# Patient Record
Sex: FEMALE | Race: WHITE | NOT HISPANIC OR LATINO
[De-identification: names, ages, dates, MRNs, and addresses within clinical notes are randomized per-mention and may not be internally consistent; named-entity substitution may affect disease eponyms.]

---

## 2017-06-30 ENCOUNTER — APPOINTMENT (OUTPATIENT)
Dept: FAMILY MEDICINE | Facility: CLINIC | Age: 22
End: 2017-06-30

## 2017-06-30 VITALS
DIASTOLIC BLOOD PRESSURE: 60 MMHG | OXYGEN SATURATION: 100 % | HEART RATE: 75 BPM | RESPIRATION RATE: 16 BRPM | SYSTOLIC BLOOD PRESSURE: 100 MMHG | HEIGHT: 63.5 IN | TEMPERATURE: 98.8 F | BODY MASS INDEX: 19.25 KG/M2 | WEIGHT: 110 LBS

## 2017-07-03 LAB — BACTERIA THROAT CULT: NORMAL

## 2017-07-14 ENCOUNTER — LABORATORY RESULT (OUTPATIENT)
Age: 22
End: 2017-07-14

## 2017-07-14 ENCOUNTER — APPOINTMENT (OUTPATIENT)
Dept: FAMILY MEDICINE | Facility: CLINIC | Age: 22
End: 2017-07-14

## 2017-07-14 VITALS
SYSTOLIC BLOOD PRESSURE: 110 MMHG | WEIGHT: 110 LBS | TEMPERATURE: 100 F | OXYGEN SATURATION: 97 % | HEART RATE: 116 BPM | HEIGHT: 63.5 IN | BODY MASS INDEX: 19.25 KG/M2 | RESPIRATION RATE: 15 BRPM | DIASTOLIC BLOOD PRESSURE: 70 MMHG

## 2017-07-17 ENCOUNTER — LABORATORY RESULT (OUTPATIENT)
Age: 22
End: 2017-07-17

## 2017-07-17 ENCOUNTER — APPOINTMENT (OUTPATIENT)
Dept: FAMILY MEDICINE | Facility: CLINIC | Age: 22
End: 2017-07-17

## 2017-07-17 VITALS
HEART RATE: 98 BPM | WEIGHT: 110 LBS | DIASTOLIC BLOOD PRESSURE: 60 MMHG | OXYGEN SATURATION: 100 % | SYSTOLIC BLOOD PRESSURE: 100 MMHG | RESPIRATION RATE: 14 BRPM | BODY MASS INDEX: 19.25 KG/M2 | TEMPERATURE: 98 F | HEIGHT: 63.5 IN

## 2017-07-17 LAB
ALBUMIN SERPL ELPH-MCNC: 4.1 G/DL
ALP BLD-CCNC: 225 U/L
ALT SERPL-CCNC: 332 U/L
ANION GAP SERPL CALC-SCNC: 17 MMOL/L
AST SERPL-CCNC: 270 U/L
BASOPHILS # BLD AUTO: 0 K/UL
BASOPHILS NFR BLD AUTO: 0 %
BILIRUB SERPL-MCNC: 1.4 MG/DL
BUN SERPL-MCNC: 11 MG/DL
CALCIUM SERPL-MCNC: 8.9 MG/DL
CHLORIDE SERPL-SCNC: 96 MMOL/L
CO2 SERPL-SCNC: 22 MMOL/L
CREAT SERPL-MCNC: 0.83 MG/DL
EOSINOPHIL # BLD AUTO: 0 K/UL
EOSINOPHIL NFR BLD AUTO: 0 %
GLUCOSE SERPL-MCNC: 171 MG/DL
HCT VFR BLD CALC: 34.1 %
HETEROPH AB SER QL: POSITIVE
HGB BLD-MCNC: 11.4 G/DL
LYMPHOCYTES # BLD AUTO: 3.5 K/UL
LYMPHOCYTES NFR BLD AUTO: 62 %
MAN DIFF?: NORMAL
MCHC RBC-ENTMCNC: 19.5 PG
MCHC RBC-ENTMCNC: 33.4 GM/DL
MCV RBC AUTO: 58.3 FL
MONOCYTES # BLD AUTO: 0.85 K/UL
MONOCYTES NFR BLD AUTO: 15 %
NEUTROPHILS # BLD AUTO: 0.73 K/UL
NEUTROPHILS NFR BLD AUTO: 12 %
PLATELET # BLD AUTO: 180 K/UL
POTASSIUM SERPL-SCNC: 3.3 MMOL/L
PROT SERPL-MCNC: 7.7 G/DL
RBC # BLD: 5.85 M/UL
RBC # FLD: 14 %
SODIUM SERPL-SCNC: 135 MMOL/L
WBC # FLD AUTO: 5.64 K/UL

## 2017-07-18 ENCOUNTER — INPATIENT (INPATIENT)
Facility: HOSPITAL | Age: 22
LOS: 0 days | Discharge: HOME | End: 2017-07-19
Attending: INTERNAL MEDICINE | Admitting: INTERNAL MEDICINE
Payer: MEDICAID

## 2017-07-18 ENCOUNTER — OUTPATIENT (OUTPATIENT)
Dept: OUTPATIENT SERVICES | Facility: HOSPITAL | Age: 22
LOS: 1 days | End: 2017-07-18
Payer: MEDICAID

## 2017-07-18 VITALS
TEMPERATURE: 100 F | HEART RATE: 98 BPM | DIASTOLIC BLOOD PRESSURE: 60 MMHG | SYSTOLIC BLOOD PRESSURE: 100 MMHG | RESPIRATION RATE: 16 BRPM | OXYGEN SATURATION: 100 %

## 2017-07-18 DIAGNOSIS — R94.5 ABNORMAL RESULTS OF LIVER FUNCTION STUDIES: ICD-10-CM

## 2017-07-18 LAB
ALBUMIN SERPL ELPH-MCNC: 3.7 G/DL — SIGNIFICANT CHANGE UP (ref 3.3–5)
ALBUMIN SERPL ELPH-MCNC: 3.9 G/DL
ALP BLD-CCNC: 265 U/L
ALP SERPL-CCNC: 268 U/L — HIGH (ref 40–120)
ALT FLD-CCNC: 319 U/L — HIGH (ref 4–33)
ALT SERPL-CCNC: 249 U/L
ANION GAP SERPL CALC-SCNC: 17 MMOL/L
ANISOCYTOSIS BLD QL: SLIGHT — SIGNIFICANT CHANGE UP
APPEARANCE UR: SIGNIFICANT CHANGE UP
APTT BLD: 33 SEC — SIGNIFICANT CHANGE UP (ref 27.5–37.4)
AST SERPL-CCNC: 227 U/L
AST SERPL-CCNC: 311 U/L — HIGH (ref 4–32)
BASE EXCESS BLDV CALC-SCNC: 0.4 MMOL/L — SIGNIFICANT CHANGE UP
BASOPHILS # BLD AUTO: 0 K/UL
BASOPHILS # BLD AUTO: 0.23 K/UL — HIGH (ref 0–0.2)
BASOPHILS NFR BLD AUTO: 0 %
BASOPHILS NFR BLD AUTO: 1.3 % — SIGNIFICANT CHANGE UP (ref 0–2)
BASOPHILS NFR SPEC: 0 % — SIGNIFICANT CHANGE UP (ref 0–2)
BILIRUB SERPL-MCNC: 2 MG/DL — HIGH (ref 0.2–1.2)
BILIRUB SERPL-MCNC: 2.4 MG/DL
BILIRUB UR-MCNC: SIGNIFICANT CHANGE UP
BLD GP AB SCN SERPL QL: NEGATIVE — SIGNIFICANT CHANGE UP
BLOOD GAS VENOUS - CREATININE: 0.89 MG/DL — SIGNIFICANT CHANGE UP (ref 0.5–1.3)
BLOOD UR QL VISUAL: HIGH
BUN SERPL-MCNC: 10 MG/DL
BUN SERPL-MCNC: 11 MG/DL — SIGNIFICANT CHANGE UP (ref 7–23)
CALCIUM SERPL-MCNC: 9 MG/DL — SIGNIFICANT CHANGE UP (ref 8.4–10.5)
CALCIUM SERPL-MCNC: 9.4 MG/DL
CHLORIDE BLDV-SCNC: 102 MMOL/L — SIGNIFICANT CHANGE UP (ref 96–108)
CHLORIDE SERPL-SCNC: 100 MMOL/L — SIGNIFICANT CHANGE UP (ref 98–107)
CHLORIDE SERPL-SCNC: 98 MMOL/L
CO2 SERPL-SCNC: 21 MMOL/L
CO2 SERPL-SCNC: 24 MMOL/L — SIGNIFICANT CHANGE UP (ref 22–31)
COLOR SPEC: YELLOW — SIGNIFICANT CHANGE UP
CREAT SERPL-MCNC: 0.85 MG/DL — SIGNIFICANT CHANGE UP (ref 0.5–1.3)
CREAT SERPL-MCNC: 0.86 MG/DL
DACRYOCYTES BLD QL SMEAR: SLIGHT — SIGNIFICANT CHANGE UP
EOSINOPHIL # BLD AUTO: 0 K/UL
EOSINOPHIL # BLD AUTO: 0.01 K/UL — SIGNIFICANT CHANGE UP (ref 0–0.5)
EOSINOPHIL NFR BLD AUTO: 0 %
EOSINOPHIL NFR BLD AUTO: 0.1 % — SIGNIFICANT CHANGE UP (ref 0–6)
EOSINOPHIL NFR FLD: 0 % — SIGNIFICANT CHANGE UP (ref 0–6)
GAS PNL BLDV: 139 MMOL/L — SIGNIFICANT CHANGE UP (ref 136–146)
GLUCOSE BLDV-MCNC: 72 — SIGNIFICANT CHANGE UP (ref 70–99)
GLUCOSE SERPL-MCNC: 72 MG/DL — SIGNIFICANT CHANGE UP (ref 70–99)
GLUCOSE SERPL-MCNC: 94 MG/DL
GLUCOSE UR-MCNC: NEGATIVE — SIGNIFICANT CHANGE UP
HBA1C MFR BLD HPLC: 5.3 %
HCO3 BLDV-SCNC: 23 MMOL/L — SIGNIFICANT CHANGE UP (ref 20–27)
HCT VFR BLD CALC: 34.1 %
HCT VFR BLD CALC: 34.3 % — LOW (ref 34.5–45)
HCT VFR BLDV CALC: 35 % — SIGNIFICANT CHANGE UP (ref 34.5–45)
HGB BLD-MCNC: 11 G/DL
HGB BLD-MCNC: 11.1 G/DL — LOW (ref 11.5–15.5)
HGB BLDV-MCNC: 11.3 G/DL — LOW (ref 11.5–15.5)
HYPOCHROMIA BLD QL: SLIGHT — SIGNIFICANT CHANGE UP
IMM GRANULOCYTES # BLD AUTO: 0.15 # — SIGNIFICANT CHANGE UP
IMM GRANULOCYTES NFR BLD AUTO: 0.8 % — SIGNIFICANT CHANGE UP (ref 0–1.5)
INR BLD: 1.04 — SIGNIFICANT CHANGE UP (ref 0.88–1.17)
KETONES UR-MCNC: SIGNIFICANT CHANGE UP
LACTATE BLDV-MCNC: 1.7 MMOL/L — SIGNIFICANT CHANGE UP (ref 0.5–2)
LEUKOCYTE ESTERASE UR-ACNC: NEGATIVE — SIGNIFICANT CHANGE UP
LIDOCAIN IGE QN: 15.4 U/L — SIGNIFICANT CHANGE UP (ref 7–60)
LPL SERPL-CCNC: 22 U/L
LYMPHOCYTES # BLD AUTO: 14.19 K/UL — HIGH (ref 1–3.3)
LYMPHOCYTES # BLD AUTO: 80 % — HIGH (ref 13–44)
LYMPHOCYTES # BLD AUTO: 9.4 K/UL
LYMPHOCYTES NFR BLD AUTO: 45 %
LYMPHOCYTES NFR SPEC AUTO: 59 % — HIGH (ref 13–44)
MAGNESIUM SERPL-MCNC: 2.2 MG/DL — SIGNIFICANT CHANGE UP (ref 1.6–2.6)
MAN DIFF?: NORMAL
MANUAL SMEAR VERIFICATION: SIGNIFICANT CHANGE UP
MCHC RBC-ENTMCNC: 19.5 PG — LOW (ref 27–34)
MCHC RBC-ENTMCNC: 19.6 PG
MCHC RBC-ENTMCNC: 32.3 GM/DL
MCHC RBC-ENTMCNC: 32.4 % — SIGNIFICANT CHANGE UP (ref 32–36)
MCV RBC AUTO: 60.3 FL — LOW (ref 80–100)
MCV RBC AUTO: 60.8 FL
MICROCYTES BLD QL: SLIGHT — SIGNIFICANT CHANGE UP
MONOCYTES # BLD AUTO: 1.04 K/UL
MONOCYTES # BLD AUTO: 1.74 K/UL — HIGH (ref 0–0.9)
MONOCYTES NFR BLD AUTO: 5 %
MONOCYTES NFR BLD AUTO: 9.8 % — SIGNIFICANT CHANGE UP (ref 2–14)
MONOCYTES NFR BLD: 14 % — HIGH (ref 2–9)
MUCOUS THREADS # UR AUTO: SIGNIFICANT CHANGE UP
NEUTROPHIL AB SER-ACNC: 12 % — LOW (ref 43–77)
NEUTROPHILS # BLD AUTO: 1.41 K/UL — LOW (ref 1.8–7.4)
NEUTROPHILS # BLD AUTO: 2.71 K/UL
NEUTROPHILS NFR BLD AUTO: 10 %
NEUTROPHILS NFR BLD AUTO: 8 % — LOW (ref 43–77)
NEUTS BAND # BLD: 3 % — SIGNIFICANT CHANGE UP (ref 0–6)
NITRITE UR-MCNC: NEGATIVE — SIGNIFICANT CHANGE UP
NRBC # FLD: 0.04 — SIGNIFICANT CHANGE UP
OVALOCYTES BLD QL SMEAR: SLIGHT — SIGNIFICANT CHANGE UP
PCO2 BLDV: 47 MMHG — SIGNIFICANT CHANGE UP (ref 41–51)
PH BLDV: 7.35 PH — SIGNIFICANT CHANGE UP (ref 7.32–7.43)
PH UR: 6.5 — SIGNIFICANT CHANGE UP (ref 4.6–8)
PLATELET # BLD AUTO: 225 K/UL
PLATELET # BLD AUTO: 242 K/UL — SIGNIFICANT CHANGE UP (ref 150–400)
PLATELET COUNT - ESTIMATE: NORMAL — SIGNIFICANT CHANGE UP
PMV BLD: SIGNIFICANT CHANGE UP FL (ref 7–13)
PO2 BLDV: < 24 MMHG — LOW (ref 35–40)
POIKILOCYTOSIS BLD QL AUTO: SLIGHT — SIGNIFICANT CHANGE UP
POTASSIUM BLDV-SCNC: 3.2 MMOL/L — LOW (ref 3.4–4.5)
POTASSIUM SERPL-MCNC: 3.6 MMOL/L — SIGNIFICANT CHANGE UP (ref 3.5–5.3)
POTASSIUM SERPL-SCNC: 3.6 MMOL/L — SIGNIFICANT CHANGE UP (ref 3.5–5.3)
POTASSIUM SERPL-SCNC: 3.9 MMOL/L
PROT SERPL-MCNC: 7.3 G/DL — SIGNIFICANT CHANGE UP (ref 6–8.3)
PROT SERPL-MCNC: 7.5 G/DL
PROT UR-MCNC: 30 — HIGH
PROTHROM AB SERPL-ACNC: 11.7 SEC — SIGNIFICANT CHANGE UP (ref 9.8–13.1)
RBC # BLD: 5.61 M/UL
RBC # BLD: 5.69 M/UL — HIGH (ref 3.8–5.2)
RBC # FLD: 14.6 %
RBC # FLD: 14.6 % — HIGH (ref 10.3–14.5)
RBC CASTS # UR COMP ASSIST: SIGNIFICANT CHANGE UP (ref 0–?)
REVIEW TO FOLLOW: YES — SIGNIFICANT CHANGE UP
RH IG SCN BLD-IMP: POSITIVE — SIGNIFICANT CHANGE UP
SAO2 % BLDV: 27.3 % — LOW (ref 60–85)
SCHISTOCYTES BLD QL AUTO: SLIGHT — SIGNIFICANT CHANGE UP
SODIUM SERPL-SCNC: 136 MMOL/L
SODIUM SERPL-SCNC: 139 MMOL/L — SIGNIFICANT CHANGE UP (ref 135–145)
SP GR SPEC: 1.01 — SIGNIFICANT CHANGE UP (ref 1–1.03)
SQUAMOUS # UR AUTO: SIGNIFICANT CHANGE UP
UROBILINOGEN FLD QL: 4 E.U. — HIGH (ref 0.1–0.2)
VARIANT LYMPHS # BLD: 12 % — SIGNIFICANT CHANGE UP
WBC # BLD: 17.73 K/UL — HIGH (ref 3.8–10.5)
WBC # FLD AUTO: 17.73 K/UL — HIGH (ref 3.8–10.5)
WBC # FLD AUTO: 20.88 K/UL
WBC UR QL: SIGNIFICANT CHANGE UP (ref 0–?)

## 2017-07-18 PROCEDURE — 76700 US EXAM ABDOM COMPLETE: CPT

## 2017-07-18 PROCEDURE — 76700 US EXAM ABDOM COMPLETE: CPT | Mod: 26

## 2017-07-18 PROCEDURE — 76705 ECHO EXAM OF ABDOMEN: CPT | Mod: 26

## 2017-07-18 PROCEDURE — 99223 1ST HOSP IP/OBS HIGH 75: CPT

## 2017-07-18 RX ORDER — MORPHINE SULFATE 50 MG/1
4 CAPSULE, EXTENDED RELEASE ORAL ONCE
Qty: 0 | Refills: 0 | Status: DISCONTINUED | OUTPATIENT
Start: 2017-07-18 | End: 2017-07-18

## 2017-07-18 NOTE — ED PROVIDER NOTE - ATTENDING CONTRIBUTION TO CARE
I performed a face to face evaluation of this patient and performed a full history and physical examination on the patient.  I agree with the resident's history, physical examination, and plan of the patient.   22 y/o female with + heterophile ab and wbc 20k and 3% bands with thickened wall on outpt gb wall with elevated lfts. will repeat u/s to r/o acalculous cholecysitis. fluids, pain control, possible admission pending work up.

## 2017-07-18 NOTE — H&P ADULT - NSHPSOCIALHISTORY_GEN_ALL_CORE
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

## 2017-07-18 NOTE — ED PROVIDER NOTE - OBJECTIVE STATEMENT
22 yo F w/ no significant pmhx presents sent in from [facility] to be evaluated for possible acalculous cholecystitis secondary to mono. Pt had 2 days of fever when she went to her doctor on Friday, was diagnosed with Mono, abdominal pain started on Saturday, localized to the RUQ, no radiation, no more fever. Denies any nausea, vomiting, diarrhea. LMP was 07/14 (on her last day today). Denies any other symptoms.

## 2017-07-18 NOTE — CONSULT NOTE ADULT - ASSESSMENT
Assessment/Plan: 21y Female presents with elevated LFTs and nonspecific gallbladder wall thickening in the setting of recent Mononucleosis diagnosis. Afebrile. Leukocytosis is mostly lymphocytic. Abnormal findings may be due to mono, but will rule out obstructive etiology. No acute surgical intervention at this time.    - Recommend MRCP to rule out obstruction  - Diet as tolerated  - No acute surgical intervention at this time  - Surgery will follow    Discussed with Dr. Ge  Pager [phone]

## 2017-07-18 NOTE — CONSULT NOTE ADULT - SUBJECTIVE AND OBJECTIVE BOX
General Surgery Consult Note (Team A)  Pager [phone]    HPI:  22yo recently diagnosed with Mononucleosis 4 days ago sent from [facility] for r/o acalculous cholecystitis. Patient     PAST MEDICAL & SURGICAL HISTORY:  No pertinent past medical history  No significant past surgical history      MEDICATIONS: None      Vital Signs Last 24 Hrs  T(C): 37.7 (2017 12:12), Max: 37.7 (2017 12:12)  T(F): 99.8 (2017 12:12), Max: 99.8 (2017 12:12)  HR: 98 (2017 12:12) (98 - 98)  BP: 100/60 (2017 12:12) (100/60 - 100/60)  BP(mean): --  RR: 16 (2017 12:12) (16 - 16)  SpO2: 100% (2017 12:12) (100% - 100%)CAPILLARY BLOOD GLUCOSE      General: No acute distress  Abdomen:  Chest: Breathing non-labored  Cardiovascular: RRR  Extremities: Non-edematous    LABS:  CBC Full  -  ( 2017 15:18 )  WBC Count : 17.73 K/uL  Hemoglobin : 11.1 g/dL  Hematocrit : 34.3 %  Platelet Count - Automated : 242 K/uL  Mean Cell Volume : 60.3 fL  Mean Cell Hemoglobin : 19.5 pg  Mean Cell Hemoglobin Concentration : 32.4 %  Auto Neutrophil # : 1.41 K/uL  Auto Lymphocyte # : 14.19 K/uL  Auto Monocyte # : 1.74 K/uL  Auto Eosinophil # : 0.01 K/uL  Auto Basophil # : 0.23 K/uL  Auto Neutrophil % : 8.0 %  Auto Lymphocyte % : 80.0 %  Auto Monocyte % : 9.8 %  Auto Eosinophil % : 0.1 %  Auto Basophil % : 1.3 %    18    139  |  100  |  11  ----------------------------<  72  3.6   |  24  |  0.85    Ca    9.0      2017 15:18  Mg     2.2         TPro  7.3  /  Alb  3.7  /  TBili  2.0<H>  /  DBili  x   /  AST  311<H>  /  ALT  319<H>  /  AlkPhos  268<H>  07-18    LIVER FUNCTIONS - ( 2017 15:18 )  Alb: 3.7 g/dL / Pro: 7.3 g/dL / ALK PHOS: 268 u/L / ALT: 319 u/L / AST: 311 u/L / GGT: x           PT/INR - ( 2017 15:18 )   PT: 11.7 SEC;   INR: 1.04          PTT - ( 2017 15:18 )  PTT:33.0 SEC  Urinalysis Basic - ( 2017 15:18 )    Color: YELLOW / Appearance: HAZY / S.014 / pH: 6.5  Gluc: NEGATIVE / Ketone: LARGE  / Bili: SMALL / Urobili: 4 E.U.   Blood: TRACE / Protein: 30 / Nitrite: NEGATIVE   Leuk Esterase: NEGATIVE / RBC: 2-5 / WBC 2-5   Sq Epi: OCC / Non Sq Epi: x / Bacteria: x      RADIOLOGY:  US Abdomen Limited (07.18.17 @ 16:01)     PROCEDURE DATE:  2017       INTERPRETATION:  CLINICAL INFORMATION: Abdominal pain. Evaluate for   cholecystitis.    COMPARISON: Ultrasound from earlier the same day at [facility].    TECHNIQUE: Sonography ofthe right upper quadrant.     FINDINGS:    Liver: Within normal limits.  Bile ducts: Normal caliber. Common hepatic duct measures 3 mm.   Gallbladder: Nondistended.  2 polyps measuring 3 mm and 2 mm. Gallbladder   wall measures up to 1 cm.      Pancreas: Visualized portions are within normal limits.  Right kidney: 10.3 cm. No hydronephrosis.  Ascites: None.  IVC: Visualized portions are within normal limits.    IMPRESSION:       2 small gallbladder polyps. Thickened gallbladder wall is nonspecific. No   evidence for acute cholecystitis. General Surgery Consult Note (Team A)  Pager [phone]    HPI:  20yo recently diagnosed with Mononucleosis 4 days ago sent from [facility] for r/o acalculous cholecystitis. Labs and imaging obtained at outside hospital showed gallbladder wall thickening with two small 2-3mm polyps as well as elevated LFTs. Patient denies abdominal pain, but describes her symptoms as more of a discomfort. No recent fevers. At the time of exam, she was eating a regular diet with no pain.     PAST MEDICAL & SURGICAL HISTORY:  No pertinent past medical history  No significant past surgical history    MEDICATIONS: None      Vital Signs Last 24 Hrs  T(C): 37.7 (2017 12:12), Max: 37.7 (2017 12:12)  T(F): 99.8 (2017 12:12), Max: 99.8 (2017 12:12)  HR: 98 (2017 12:12) (98 - 98)  BP: 100/60 (2017 12:12) (100/60 - 100/60)  BP(mean): --  RR: 16 (2017 12:12) (16 - 16)  SpO2: 100% (2017 12:12) (100% - 100%)CAPILLARY BLOOD GLUCOSE      General: No acute distress, no jaundice   Abdomen: Soft, nondistended, nontender  Chest: Breathing non-labored  Cardiovascular: RRR  Extremities: Non-edematous    LABS:  CBC Full  -  ( 2017 15:18 )  WBC Count : 17.73 K/uL  Hemoglobin : 11.1 g/dL  Hematocrit : 34.3 %  Platelet Count - Automated : 242 K/uL  Mean Cell Volume : 60.3 fL  Mean Cell Hemoglobin : 19.5 pg  Mean Cell Hemoglobin Concentration : 32.4 %  Auto Neutrophil # : 1.41 K/uL  Auto Lymphocyte # : 14.19 K/uL  Auto Monocyte # : 1.74 K/uL  Auto Eosinophil # : 0.01 K/uL  Auto Basophil # : 0.23 K/uL  Auto Neutrophil % : 8.0 %  Auto Lymphocyte % : 80.0 %  Auto Monocyte % : 9.8 %  Auto Eosinophil % : 0.1 %  Auto Basophil % : 1.3 %    07-18    139  |  100  |  11  ----------------------------<  72  3.6   |  24  |  0.85    Ca    9.0      2017 15:18  Mg     2.2         TPro  7.3  /  Alb  3.7  /  TBili  2.0<H>  /  DBili  x   /  AST  311<H>  /  ALT  319<H>  /  AlkPhos  268<H>      LIVER FUNCTIONS - ( 2017 15:18 )  Alb: 3.7 g/dL / Pro: 7.3 g/dL / ALK PHOS: 268 u/L / ALT: 319 u/L / AST: 311 u/L / GGT: x           PT/INR - ( 2017 15:18 )   PT: 11.7 SEC;   INR: 1.04          PTT - ( 2017 15:18 )  PTT:33.0 SEC  Urinalysis Basic - ( 2017 15:18 )    Color: YELLOW / Appearance: HAZY / S.014 / pH: 6.5  Gluc: NEGATIVE / Ketone: LARGE  / Bili: SMALL / Urobili: 4 E.U.   Blood: TRACE / Protein: 30 / Nitrite: NEGATIVE   Leuk Esterase: NEGATIVE / RBC: 2-5 / WBC 2-5   Sq Epi: OCC / Non Sq Epi: x / Bacteria: x      RADIOLOGY:  US Abdomen Limited (17 @ 16:01)     PROCEDURE DATE:  2017       INTERPRETATION:  CLINICAL INFORMATION: Abdominal pain. Evaluate for   cholecystitis.    COMPARISON: Ultrasound from earlier the same day at [facility].    TECHNIQUE: Sonography of the right upper quadrant.     FINDINGS:    Liver: Within normal limits.  Bile ducts: Normal caliber. Common hepatic duct measures 3 mm.   Gallbladder: Nondistended.  2 polyps measuring 3 mm and 2 mm. Gallbladder   wall measures up to 1 cm.      Pancreas: Visualized portions are within normal limits.  Right kidney: 10.3 cm. No hydronephrosis.  Ascites: None.  IVC: Visualized portions are within normal limits.    IMPRESSION:       2 small gallbladder polyps. Thickened gallbladder wall is nonspecific. No   evidence for acute cholecystitis.

## 2017-07-18 NOTE — H&P ADULT - MUSCULOSKELETAL
details… detailed exam no joint warmth/no joint erythema/no joint swelling/ROM intact/normal strength/no calf tenderness

## 2017-07-18 NOTE — ED PROVIDER NOTE - MEDICAL DECISION MAKING DETAILS
20 yo F w/ no significant pmhx presents sent in from [facility] to be evaluated for possible acalculous cholecystitis secondary to mono. Pt had 2 days of fever when she went to her doctor on Friday, was diagnosed with Mono, abdominal pain started on Saturday, localized to the RUQ, no radiation, no more fever. Denies any nausea, vomiting, diarrhea. LMP was 07/14 (on her last day today). Denies any other symptoms.

## 2017-07-18 NOTE — H&P ADULT - HISTORY OF PRESENT ILLNESS
20 yo F w/ no significant pmhx sent in from [facility] to be evaluated for possible acalculous cholecystitis secondary to mono. Pt had 2 days of fever when she went to see her doctor on Friday, was diagnosed with Mono, abdominal pain started on Saturday, localized to the RUQ, no radiation, no more fever. Denies any nausea, vomiting, diarrhea. LMP was 07/14 (on her last day today). Denies any other symptoms. No  CP, NO SOB, No N/V, No HA, no Dizziness, no rash, + Sore throat but is improving, no dysuria, no cough , Labs and imaging obtained at outside hospital showed gallbladder wall thickening with two small 2-3mm polyps as well as elevated LFTs. pt was seen by surgery recommended MRCP, pt is accompanied with her parents, A+O X 3 No abdominal pain now just some RUQ discomfort ,    Labs: Urine HCG Neg., Na 139, K+ 3.6, BUN 11, Creatinine 0.85, Glucose 72, total Bili 2, Lipase 15.4, , , WBC 17.73, Hgb 11.1, MCV 60.3, Platelet 242, Lymph 80%, PT 11.7, INR 1.04, PTT  33, UA: Hazy, trace Blood, Small Bili, Large Ketone     Vitals: Tem 98.1, HR 70, /64, RR 18, 100 % RA

## 2017-07-18 NOTE — H&P ADULT - PROBLEM SELECTOR PLAN 2
GI Consult, Surgery F/U, RUQ pain but improving, no fever in [facility],  MRCP , R/O acalculous Cholecystitis, Possible Mono Related symptoms, IVF NS  CBC, CMP, AMA, Anti Smooth Muscle Ab, Hep A,B,C profile, Ceruloplasmin, Iron Studies, Vit B12, Folate, ferritin , Fasting Lipid, Amylase, Lipase   Low MCV ,   Rest ,

## 2017-07-18 NOTE — ED PROVIDER NOTE - PROGRESS NOTE DETAILS
Dr. Pham was paged twice but no answer yet [facility] - sign out received - surg recs admission to medicine for MRCP. Discussed case with GI fellow on call who states pt will be seen in the am on the floor.

## 2017-07-18 NOTE — H&P ADULT - NEGATIVE MUSCULOSKELETAL SYMPTOMS
no leg pain L/no arthritis/no leg pain R/no arm pain R/no neck pain/no arm pain L/no back pain/no arthralgia

## 2017-07-18 NOTE — ED ADULT NURSE NOTE - OBJECTIVE STATEMENT
Ambulatory accompanied by family, seen at [facility] yesterday for MONO symptoms and r/o  abdominal inflammation. SONO/lab work performed. Patient is here saying she was "sent in for a consult," refusing all lab work, 4mg morphine and repeat SONO. Patient is rude to staff members and "doesn't want to be here." Resident Shaheed aware and will confirm with patient. Will continue to monitor.

## 2017-07-18 NOTE — ED ADULT TRIAGE NOTE - CHIEF COMPLAINT QUOTE
pt diagnosed w mono 4 days ago.  had sono today and pt sent here for further eval.  "I don't want to be here."  "im fine"  well appearing female in nad.     ****see md note.

## 2017-07-18 NOTE — H&P ADULT - ASSESSMENT
22 yo F w/ no significant pmhx sent in from [facility] to be evaluated for possible acalculous cholecystitis secondary to mono. Pt had 2 days of fever when she went to see her doctor on Friday, was diagnosed with Mono, abdominal pain started on Saturday, localized to the RUQ, no radiation, no more fever. Denies any nausea, vomiting, diarrhea. LMP was 07/14 (on her last day today). Denies any other symptoms. No  CP, NO SOB, No N/V, No HA, no Dizziness, no rash, + Sore throat but is improving, no dysuria, no cough , Labs and imaging obtained at outside hospital showed gallbladder wall thickening with two small 2-3mm polyps as well as elevated LFTs. pt was seen by surgery recommended MRCP, pt is accompanied with her parents, A+O X 3 No abdominal pain now just some RUQ discomfort ,

## 2017-07-18 NOTE — H&P ADULT - RS GEN PE MLT RESP DETAILS PC
respirations non-labored/clear to auscultation bilaterally/good air movement/no chest wall tenderness

## 2017-07-19 ENCOUNTER — TRANSCRIPTION ENCOUNTER (OUTPATIENT)
Age: 22
End: 2017-07-19

## 2017-07-19 VITALS
OXYGEN SATURATION: 100 % | HEART RATE: 72 BPM | SYSTOLIC BLOOD PRESSURE: 95 MMHG | TEMPERATURE: 99 F | DIASTOLIC BLOOD PRESSURE: 55 MMHG | RESPIRATION RATE: 18 BRPM

## 2017-07-19 DIAGNOSIS — R94.5 ABNORMAL RESULTS OF LIVER FUNCTION STUDIES: ICD-10-CM

## 2017-07-19 DIAGNOSIS — Z29.9 ENCOUNTER FOR PROPHYLACTIC MEASURES, UNSPECIFIED: ICD-10-CM

## 2017-07-19 DIAGNOSIS — K82.4 CHOLESTEROLOSIS OF GALLBLADDER: ICD-10-CM

## 2017-07-19 DIAGNOSIS — B27.90 INFECTIOUS MONONUCLEOSIS, UNSPECIFIED WITHOUT COMPLICATION: ICD-10-CM

## 2017-07-19 LAB
ALBUMIN SERPL ELPH-MCNC: 3.3 G/DL — SIGNIFICANT CHANGE UP (ref 3.3–5)
ALP SERPL-CCNC: 263 U/L — HIGH (ref 40–120)
ALT FLD-CCNC: 314 U/L — HIGH (ref 4–33)
AMYLASE P1 CFR SERPL: 28 U/L — SIGNIFICANT CHANGE UP (ref 25–125)
APTT BLD: 32.3 SEC — SIGNIFICANT CHANGE UP (ref 27.5–37.4)
AST SERPL-CCNC: 281 U/L — HIGH (ref 4–32)
BASOPHILS # BLD AUTO: 0.09 K/UL — SIGNIFICANT CHANGE UP (ref 0–0.2)
BASOPHILS NFR BLD AUTO: 0.9 % — SIGNIFICANT CHANGE UP (ref 0–2)
BILIRUB SERPL-MCNC: 1.6 MG/DL — HIGH (ref 0.2–1.2)
BUN SERPL-MCNC: 8 MG/DL — SIGNIFICANT CHANGE UP (ref 7–23)
CALCIUM SERPL-MCNC: 8.4 MG/DL — SIGNIFICANT CHANGE UP (ref 8.4–10.5)
CERULOPLASMIN SERPL-MCNC: 35 MG/DL — SIGNIFICANT CHANGE UP (ref 20–60)
CHLORIDE SERPL-SCNC: 102 MMOL/L — SIGNIFICANT CHANGE UP (ref 98–107)
CHOLEST SERPL-MCNC: 99 MG/DL — LOW (ref 120–199)
CK SERPL-CCNC: 12 U/L — LOW (ref 25–170)
CO2 SERPL-SCNC: 24 MMOL/L — SIGNIFICANT CHANGE UP (ref 22–31)
CREAT SERPL-MCNC: 0.79 MG/DL — SIGNIFICANT CHANGE UP (ref 0.5–1.3)
EOSINOPHIL # BLD AUTO: 0.01 K/UL — SIGNIFICANT CHANGE UP (ref 0–0.5)
EOSINOPHIL NFR BLD AUTO: 0.1 % — SIGNIFICANT CHANGE UP (ref 0–6)
FERRITIN SERPL-MCNC: 175.6 NG/ML — HIGH (ref 15–150)
FOLATE SERPL-MCNC: 14 NG/ML — SIGNIFICANT CHANGE UP (ref 4.7–20)
GLUCOSE SERPL-MCNC: 80 MG/DL — SIGNIFICANT CHANGE UP (ref 70–99)
HAV IGM SER-ACNC: NONREACTIVE — SIGNIFICANT CHANGE UP
HBA1C BLD-MCNC: 5.5 % — SIGNIFICANT CHANGE UP (ref 4–5.6)
HBV CORE IGM SER-ACNC: NONREACTIVE — SIGNIFICANT CHANGE UP
HBV SURFACE AG SER-ACNC: NONREACTIVE — SIGNIFICANT CHANGE UP
HCG SERPL-ACNC: < 5 MIU/ML — SIGNIFICANT CHANGE UP
HCT VFR BLD CALC: 30.7 % — LOW (ref 34.5–45)
HCV AB S/CO SERPL IA: 0.28 S/CO — SIGNIFICANT CHANGE UP
HCV AB SERPL-IMP: SIGNIFICANT CHANGE UP
HDLC SERPL-MCNC: 12 MG/DL — LOW (ref 45–65)
HETEROPH AB TITR SER AGGL: POSITIVE — HIGH
HGB BLD-MCNC: 10.3 G/DL — LOW (ref 11.5–15.5)
HIV1 AG SER QL: SIGNIFICANT CHANGE UP
HIV1+2 AB SPEC QL: SIGNIFICANT CHANGE UP
IMM GRANULOCYTES # BLD AUTO: 0.1 # — SIGNIFICANT CHANGE UP
IMM GRANULOCYTES NFR BLD AUTO: 1 % — SIGNIFICANT CHANGE UP (ref 0–1.5)
INR BLD: 1.03 — SIGNIFICANT CHANGE UP (ref 0.88–1.17)
IRON SATN MFR SERPL: 260 UG/DL — SIGNIFICANT CHANGE UP (ref 140–530)
IRON SATN MFR SERPL: 88 UG/DL — SIGNIFICANT CHANGE UP (ref 30–160)
LIDOCAIN IGE QN: 19.7 U/L — SIGNIFICANT CHANGE UP (ref 7–60)
LIPID PNL WITH DIRECT LDL SERPL: 44 MG/DL — SIGNIFICANT CHANGE UP
LYMPHOCYTES # BLD AUTO: 8.33 K/UL — HIGH (ref 1–3.3)
LYMPHOCYTES # BLD AUTO: 81.8 % — HIGH (ref 13–44)
MAGNESIUM SERPL-MCNC: 2.1 MG/DL — SIGNIFICANT CHANGE UP (ref 1.6–2.6)
MANUAL SMEAR VERIFICATION: SIGNIFICANT CHANGE UP
MCHC RBC-ENTMCNC: 20.8 PG — LOW (ref 27–34)
MCHC RBC-ENTMCNC: 33.6 % — SIGNIFICANT CHANGE UP (ref 32–36)
MCV RBC AUTO: 61.9 FL — LOW (ref 80–100)
MONOCYTES # BLD AUTO: 0.52 K/UL — SIGNIFICANT CHANGE UP (ref 0–0.9)
MONOCYTES NFR BLD AUTO: 5.1 % — SIGNIFICANT CHANGE UP (ref 2–14)
NEUTROPHILS # BLD AUTO: 1.13 K/UL — LOW (ref 1.8–7.4)
NEUTROPHILS NFR BLD AUTO: 11.1 % — LOW (ref 43–77)
NRBC # FLD: 0.03 — SIGNIFICANT CHANGE UP
PHOSPHATE SERPL-MCNC: 3.7 MG/DL — SIGNIFICANT CHANGE UP (ref 2.5–4.5)
PLATELET # BLD AUTO: 241 K/UL — SIGNIFICANT CHANGE UP (ref 150–400)
PMV BLD: SIGNIFICANT CHANGE UP FL (ref 7–13)
POTASSIUM SERPL-MCNC: 3.5 MMOL/L — SIGNIFICANT CHANGE UP (ref 3.5–5.3)
POTASSIUM SERPL-SCNC: 3.5 MMOL/L — SIGNIFICANT CHANGE UP (ref 3.5–5.3)
PROT SERPL-MCNC: 6.7 G/DL — SIGNIFICANT CHANGE UP (ref 6–8.3)
PROTHROM AB SERPL-ACNC: 11.5 SEC — SIGNIFICANT CHANGE UP (ref 9.8–13.1)
RBC # BLD: 4.96 M/UL — SIGNIFICANT CHANGE UP (ref 3.8–5.2)
RBC # FLD: 15.9 % — HIGH (ref 10.3–14.5)
SODIUM SERPL-SCNC: 139 MMOL/L — SIGNIFICANT CHANGE UP (ref 135–145)
T4 FREE SERPL-MCNC: 1.25 NG/DL — SIGNIFICANT CHANGE UP (ref 0.9–1.8)
TRIGL SERPL-MCNC: 175 MG/DL — HIGH (ref 10–149)
TSH SERPL-MCNC: 1.05 UIU/ML — SIGNIFICANT CHANGE UP (ref 0.5–4.3)
UIBC SERPL-MCNC: 172 UG/DL — SIGNIFICANT CHANGE UP (ref 110–370)
VIT B12 SERPL-MCNC: 790 PG/ML — SIGNIFICANT CHANGE UP (ref 200–900)
WBC # BLD: 10.18 K/UL — SIGNIFICANT CHANGE UP (ref 3.8–10.5)
WBC # FLD AUTO: 10.18 K/UL — SIGNIFICANT CHANGE UP (ref 3.8–10.5)

## 2017-07-19 PROCEDURE — 74182 MRI ABDOMEN W/CONTRAST: CPT | Mod: 26

## 2017-07-19 RX ORDER — SODIUM CHLORIDE 9 MG/ML
1000 INJECTION INTRAMUSCULAR; INTRAVENOUS; SUBCUTANEOUS
Qty: 0 | Refills: 0 | Status: DISCONTINUED | OUTPATIENT
Start: 2017-07-18 | End: 2017-07-19

## 2017-07-19 RX ADMIN — SODIUM CHLORIDE 125 MILLILITER(S): 9 INJECTION INTRAMUSCULAR; INTRAVENOUS; SUBCUTANEOUS at 08:30

## 2017-07-19 RX ADMIN — SODIUM CHLORIDE 125 MILLILITER(S): 9 INJECTION INTRAMUSCULAR; INTRAVENOUS; SUBCUTANEOUS at 00:14

## 2017-07-19 NOTE — PROGRESS NOTE ADULT - SUBJECTIVE AND OBJECTIVE BOX
20yo F with mononucleosis, elevated LFTs, and RUQ tenderness    Vital Signs Last 24 Hrs  T(C): 37.1 (2017 06:53), Max: 37.7 (2017 12:12)  T(F): 98.7 (2017 06:53), Max: 99.8 (2017 12:12)  HR: 72 (2017 06:53) (70 - 98)  BP: 95/55 (2017 06:53) (95/55 - 111/64)  BP(mean): --  RR: 18 (2017 06:53) (16 - 18)  SpO2: 100% (2017 06:53) (100% - 100%)      SUBJECTIVE: Pt seen and examined. Minimal RUQ discomfort. No nausea, no vomiting. Pt hungry and awaiting MRCP.     General Appearance: awake, alert, NAD  Neck: supple  Chest: equal chest rise, nonlabored respirations  CV: RRR  Abdomen: soft, ND, minimal RUQ tenderness, no rebound, no guarding, negative young  Extremities: HIGGINS    I&O's Summary    I&O's Detail      MEDICATIONS  (STANDING):  sodium chloride 0.9%. 1000 milliLiter(s) (125 mL/Hr) IV Continuous <Continuous>    MEDICATIONS  (PRN):      LABS:                        10.3   10.18 )-----------( 241      ( 2017 07:00 )             30.7         139  |  102  |  8   ----------------------------<  80  3.5   |  24  |  0.79    Ca    8.4      2017 07:00  Phos  3.7       Mg     2.1         TPro  6.7  /  Alb  3.3  /  TBili  1.6<H>  /  DBili  x   /  AST  281<H>  /  ALT  314<H>  /  AlkPhos  263<H>      PT/INR - ( 2017 07:00 )   PT: 11.5 SEC;   INR: 1.03          PTT - ( 2017 07:00 )  PTT:32.3 SEC  Urinalysis Basic - ( 2017 15:18 )    Color: YELLOW / Appearance: HAZY / S.014 / pH: 6.5  Gluc: NEGATIVE / Ketone: LARGE  / Bili: SMALL / Urobili: 4 E.U.   Blood: TRACE / Protein: 30 / Nitrite: NEGATIVE   Leuk Esterase: NEGATIVE / RBC: 2-5 / WBC 2-5   Sq Epi: OCC / Non Sq Epi: x / Bacteria: x      Ultrasound:  2 small gallbladder polyps. Thickened gallbladder wall is nonspecific. No   evidence for acute cholecystitis.

## 2017-07-19 NOTE — DISCHARGE NOTE ADULT - CARE PROVIDERS DIRECT ADDRESSES
[email or web address]

## 2017-07-19 NOTE — CONSULT NOTE ADULT - SUBJECTIVE AND OBJECTIVE BOX
ADVANCED GI INITIAL CONSULT:    HPI: This is a 20 y/o F with no PMH who presents with RUQ pain and elevated liver enzymes. She went to her PCP on Friday where she was diagnosed to have Mononucleosis. She presented to [facility] for persistent symptoms, but with resolution of fever. No nausea or vomiting. No recent travels. She had an US at [facility] that reportedly showed showed gallbladder wall thickening with two small 2-3mm polyps. She was ordered to have an MRCP. Her abdominal pain is improving. She reported subjective fevers 3 days ago, that has now resolved.     PAST MEDICAL & SURGICAL HISTORY:  No pertinent past medical history  No significant past surgical history    Review of Systems: Negative except as per HPI.    MEDICATIONS  (STANDING):  sodium chloride 0.9%. 1000 milliLiter(s) (125 mL/Hr) IV Continuous <Continuous>    ALLERGIES: NKDA    SOCIAL HISTORY:  - Alcohol: denies  - Recreational drug use: denies    FAMILY HISTORY:  No pertinent family history in first degree relatives    Vital Signs Last 24 Hrs  T(C): 37.1 (19 Jul 2017 06:53), Max: 37.7 (18 Jul 2017 12:12)  T(F): 98.7 (19 Jul 2017 06:53), Max: 99.8 (18 Jul 2017 12:12)  HR: 72 (19 Jul 2017 06:53) (70 - 98)  BP: 95/55 (19 Jul 2017 06:53) (95/55 - 111/64)  BP(mean): --  RR: 18 (19 Jul 2017 06:53) (16 - 18)  SpO2: 100% (19 Jul 2017 06:53) (100% - 100%)    PHYSICAL EXAM:  Constitutional: no acute distress  Eyes: no icterus  Neck: no masses, no LAD  Respiratory: normal inspiratory effort; no wheezing or crackles  Cardiovascular: RRR, normal S1/S2, no murmurs/rubs/gallops  Gastrointestinal: soft, nondistended, nontender, +BS  Extremities: no LE edema  Neurological: AAOx3, no asterixis  Skin: no rashes, bruises, petechiae    LABS:                        10.3   10.18 )-----------( 241      ( 19 Jul 2017 07:00 )             30.7     139  |  102  |  8   ----------------------------<  80  3.5   |  24  |  0.79    Ca    8.4      19 Jul 2017 07:00  Phos  3.7     07-19  Mg     2.1     07-19    TPro  6.7  /  Alb  3.3  /  TBili  1.6<H>  /  DBili  x   /  AST  281<H>  /  ALT  314<H>  /  AlkPhos  263<H>  07-19    PT/INR - ( 19 Jul 2017 07:00 )   PT: 11.5 SEC;   INR: 1.03        PTT - ( 19 Jul 2017 07:00 )  PTT:32.3 SEC  LIVER FUNCTIONS - ( 19 Jul 2017 07:00 )  Alb: 3.3 g/dL / Pro: 6.7 g/dL / ALK PHOS: 263 u/L / ALT: 314 u/L / AST: 281 u/L / GGT: x

## 2017-07-19 NOTE — DISCHARGE NOTE ADULT - PATIENT PORTAL LINK FT
“You can access the Follow[employer] Patient Portal, offered by [facility], by registering with the following website: [email or web address]”

## 2017-07-19 NOTE — DISCHARGE NOTE ADULT - PLAN OF CARE
Resolution Supportive management  Remain well hydrated  Regular diet as tolerated  No sports or exercise for 3 to 4 weeks Follow up with your primary care provider within 1 week of discharge.   Repeat liver function tests.   Call to schedule your appointment Follow up with your primary care provider on Monday for repeat liver function tests.   Call to schedule your appointment  You may follow up with the Dr Hubbard from hepatology in your liver function tests do not improve. You may call the number below to schedule your appointment Follow up with your primary care provider and or gastroenterology as directed above

## 2017-07-19 NOTE — DISCHARGE NOTE ADULT - CARE PROVIDER_API CALL
Kike Arnold), Family Medicine  [address] [place] [address]  Phone: [phone]  Fax: [phone] Kike Arnold), Family Medicine  [address] [place] [id number]  Phone: [phone]  Fax: [phone]    Santos Hubbard), Gastroenterology; Internal Medicine  [id number] [place] [address] [place] [address]  Phone: [phone]  Fax: [phone]

## 2017-07-19 NOTE — CONSULT NOTE ADULT - ASSESSMENT
Impression:  1) Elevated Liver Enzymes - suspect due to Infectious Mononucleosis given positive monospot and clinical presentation.   2) Cholelithiasis    Plan:  - F/u MRCP  - F/u viral hepatitis serologies (also send Hepatitis E IgM)  - IVF  - If MRCP shows biliary obstruction, then would need ERCP Impression:  1) Elevated Liver Enzymes - suspect due to Infectious Mononucleosis given positive monospot and clinical presentation.   2) Gallbladder Polyps    Plan:  - F/u MRCP  - F/u viral hepatitis serologies (also send Hepatitis E IgM)  - IVF  - If MRCP shows biliary obstruction, then would need ERCP

## 2017-07-19 NOTE — CHART NOTE - NSCHARTNOTEFT_GEN_A_CORE
MRI/MRCP reviewed with Radiology - the gallbladder and spleen is enlarged, consistent with hepatitis. No biliary obstruction. No plan for EUS/ERCP at this time.

## 2017-07-19 NOTE — PROGRESS NOTE ADULT - ASSESSMENT
22yo F with mononucleosis, elevated LFTs, RUQ discomfort, and gallbladder wall thickening without evidence of acute cholecystitis

## 2017-07-19 NOTE — PROGRESS NOTE ADULT - PROBLEM SELECTOR PLAN 1
- MRCP today   - follow up hepatitis panel  - bilirubin and WBC improved today  - no acute surgical intervention at this time  - will need outpatient follow up for gallbladder polyps    Discussed with Dr. Ge

## 2017-07-19 NOTE — DISCHARGE NOTE ADULT - HOSPITAL COURSE
22 yo F w/ no significant pmhx sent in from [facility] to be evaluated for possible acalculous cholecystitis secondary to mono. Pt had 2 days of fever when she went to see her doctor on Friday, was diagnosed with Mono, abdominal pain started on Saturday, localized to the RUQ, no radiation, no more fever. Denies any nausea, vomiting, diarrhea. LMP was 07/14 (on her last day today). Denies any other symptoms. No  CP, NO SOB, No N/V, No HA, no Dizziness, no rash, + Sore throat but is improving, no dysuria, no cough , Labs and imaging obtained at outside hospital showed gallbladder wall thickening with two small 2-3mm polyps as well as elevated LFTs. pt was seen by surgery recommended MRCP, pt is accompanied with her parents, A+O X 3 No abdominal pain now just some RUQ discomfort. MRCP negative. Diet advanced to regular. Tolerated well. Medically stable for discharge home w/ PCP and GI outpt f/u. Supportive care for mono

## 2017-07-19 NOTE — DISCHARGE NOTE ADULT - CARE PLAN
Principal Discharge DX:	Mononucleosis  Goal:	Resolution  Instructions for follow-up, activity and diet:	Supportive management  Remain well hydrated  Regular diet as tolerated  No sports or exercise for 3 to 4 weeks  Secondary Diagnosis:	Elevated LFTs  Instructions for follow-up, activity and diet:	Follow up with your primary care provider within 1 week of discharge.   Repeat liver function tests.   Call to schedule your appointment Principal Discharge DX:	Mononucleosis  Goal:	Resolution  Instructions for follow-up, activity and diet:	Supportive management  Remain well hydrated  Regular diet as tolerated  No sports or exercise for 3 to 4 weeks  Secondary Diagnosis:	Elevated LFTs  Instructions for follow-up, activity and diet:	Follow up with your primary care provider on Monday for repeat liver function tests.   Call to schedule your appointment  You may follow up with the Dr Hubbard from hepatology in your liver function tests do not improve. You may call the number below to schedule your appointment Principal Discharge DX:	Mononucleosis  Goal:	Resolution  Instructions for follow-up, activity and diet:	Supportive management  Remain well hydrated  Regular diet as tolerated  No sports or exercise for 3 to 4 weeks  Secondary Diagnosis:	Elevated LFTs  Instructions for follow-up, activity and diet:	Follow up with your primary care provider on Monday for repeat liver function tests.   Call to schedule your appointment  You may follow up with the Dr Hubbard from hepatology in your liver function tests do not improve. You may call the number below to schedule your appointment  Secondary Diagnosis:	Gallbladder polyp  Instructions for follow-up, activity and diet:	Follow up with your primary care provider and or gastroenterology as directed above

## 2017-07-20 LAB
EBV EA AB TITR SER IF: NEGATIVE — SIGNIFICANT CHANGE UP
EBV EA IGG SER-ACNC: POSITIVE — SIGNIFICANT CHANGE UP
EBV PATRN SPEC IB-IMP: SIGNIFICANT CHANGE UP
EBV VCA IGG AVIDITY SER QL IA: POSITIVE — SIGNIFICANT CHANGE UP
EBV VCA IGM TITR FLD: POSITIVE — SIGNIFICANT CHANGE UP
MITOCHONDRIA AB SER-ACNC: SIGNIFICANT CHANGE UP
SMOOTH MUSCLE AB SER-ACNC: SIGNIFICANT CHANGE UP

## 2017-07-21 ENCOUNTER — APPOINTMENT (OUTPATIENT)
Dept: FAMILY MEDICINE | Facility: CLINIC | Age: 22
End: 2017-07-21

## 2017-07-21 VITALS
SYSTOLIC BLOOD PRESSURE: 110 MMHG | HEART RATE: 97 BPM | OXYGEN SATURATION: 100 % | RESPIRATION RATE: 14 BRPM | DIASTOLIC BLOOD PRESSURE: 60 MMHG | TEMPERATURE: 98.8 F | WEIGHT: 110 LBS

## 2017-07-25 RX ORDER — DOXYCYCLINE HYCLATE 100 MG/1
100 CAPSULE ORAL
Qty: 20 | Refills: 0 | Status: DISCONTINUED | COMMUNITY
Start: 2017-07-14 | End: 2017-07-25

## 2017-07-25 RX ORDER — AMOXICILLIN 500 MG/1
500 TABLET, FILM COATED ORAL
Qty: 14 | Refills: 0 | Status: DISCONTINUED | COMMUNITY
Start: 2017-06-30 | End: 2017-07-25

## 2017-07-26 LAB
B BURGDOR AB SER-IMP: NEGATIVE
B BURGDOR IGM PATRN SER IB-IMP: POSITIVE
B BURGDOR18/20KD IGM SER QL IB: NORMAL
B BURGDOR18KD IGG SER QL IB: NORMAL
B BURGDOR23KD IGG SER QL IB: NORMAL
B BURGDOR23KD IGM SER QL IB: PRESENT
B BURGDOR28KD AB SER QL IB: NORMAL
B BURGDOR28KD IGG SER QL IB: NORMAL
B BURGDOR30KD AB SER QL IB: NORMAL
B BURGDOR30KD IGG SER QL IB: NORMAL
B BURGDOR31KD IGG SER QL IB: NORMAL
B BURGDOR31KD IGM SER QL IB: NORMAL
B BURGDOR39KD IGG SER QL IB: NORMAL
B BURGDOR39KD IGM SER QL IB: NORMAL
B BURGDOR41KD IGG SER QL IB: PRESENT
B BURGDOR41KD IGM SER QL IB: PRESENT
B BURGDOR45KD AB SER QL IB: NORMAL
B BURGDOR45KD IGG SER QL IB: NORMAL
B BURGDOR58KD AB SER QL IB: NORMAL
B BURGDOR58KD IGG SER QL IB: PRESENT
B BURGDOR66KD IGG SER QL IB: PRESENT
B BURGDOR66KD IGM SER QL IB: NORMAL
B BURGDOR93KD IGG SER QL IB: NORMAL
B BURGDOR93KD IGM SER QL IB: NORMAL

## 2017-07-27 LAB
ALBUMIN SERPL ELPH-MCNC: 4 G/DL
ALP BLD-CCNC: 166 U/L
ALT SERPL-CCNC: 57 U/L
AST SERPL-CCNC: 43 U/L
BILIRUB DIRECT SERPL-MCNC: 0.3 MG/DL
BILIRUB INDIRECT SERPL-MCNC: 0.3 MG/DL
BILIRUB SERPL-MCNC: 0.6 MG/DL
PROT SERPL-MCNC: 7.7 G/DL

## 2017-08-02 LAB
B BURGDOR AB SER-IMP: NEGATIVE
B BURGDOR AB SER-IMP: POSITIVE
B BURGDOR IGG+IGM SER QL: 0.42 INDEX
B BURGDOR IGM PATRN SER IB-IMP: POSITIVE
B BURGDOR18/20KD IGM SER QL IB: NORMAL
B BURGDOR18KD IGG SER QL IB: NORMAL
B BURGDOR23KD IGG SER QL IB: NORMAL
B BURGDOR23KD IGM SER QL IB: PRESENT
B BURGDOR28KD AB SER QL IB: NORMAL
B BURGDOR28KD IGG SER QL IB: NORMAL
B BURGDOR30KD AB SER QL IB: NORMAL
B BURGDOR30KD IGG SER QL IB: NORMAL
B BURGDOR31KD IGG SER QL IB: NORMAL
B BURGDOR31KD IGM SER QL IB: NORMAL
B BURGDOR39KD IGG SER QL IB: PRESENT
B BURGDOR39KD IGM SER QL IB: NORMAL
B BURGDOR41KD IGG SER QL IB: PRESENT
B BURGDOR41KD IGM SER QL IB: PRESENT
B BURGDOR45KD AB SER QL IB: NORMAL
B BURGDOR45KD IGG SER QL IB: PRESENT
B BURGDOR58KD AB SER QL IB: NORMAL
B BURGDOR58KD IGG SER QL IB: PRESENT
B BURGDOR66KD IGG SER QL IB: PRESENT
B BURGDOR66KD IGM SER QL IB: NORMAL
B BURGDOR93KD IGG SER QL IB: NORMAL
B BURGDOR93KD IGM SER QL IB: NORMAL

## 2017-08-03 ENCOUNTER — OUTPATIENT (OUTPATIENT)
Dept: OUTPATIENT SERVICES | Facility: HOSPITAL | Age: 22
LOS: 1 days | End: 2017-08-03
Payer: MEDICAID

## 2017-08-03 PROCEDURE — 76700 US EXAM ABDOM COMPLETE: CPT

## 2017-08-03 PROCEDURE — 76700 US EXAM ABDOM COMPLETE: CPT | Mod: 26

## 2017-08-06 ENCOUNTER — TRANSCRIPTION ENCOUNTER (OUTPATIENT)
Age: 22
End: 2017-08-06

## 2017-08-11 ENCOUNTER — TRANSCRIPTION ENCOUNTER (OUTPATIENT)
Age: 22
End: 2017-08-11

## 2017-08-21 ENCOUNTER — APPOINTMENT (OUTPATIENT)
Dept: FAMILY MEDICINE | Facility: CLINIC | Age: 22
End: 2017-08-21
Payer: MEDICAID

## 2017-08-21 VITALS
DIASTOLIC BLOOD PRESSURE: 60 MMHG | HEART RATE: 80 BPM | TEMPERATURE: 98.6 F | SYSTOLIC BLOOD PRESSURE: 100 MMHG | RESPIRATION RATE: 14 BRPM | WEIGHT: 106 LBS

## 2017-08-21 PROCEDURE — 99214 OFFICE O/P EST MOD 30 MIN: CPT

## 2017-08-23 ENCOUNTER — LABORATORY RESULT (OUTPATIENT)
Age: 22
End: 2017-08-23

## 2017-08-23 ENCOUNTER — OUTPATIENT (OUTPATIENT)
Dept: OUTPATIENT SERVICES | Facility: HOSPITAL | Age: 22
LOS: 1 days | End: 2017-08-23
Payer: MEDICAID

## 2017-08-23 PROCEDURE — 76700 US EXAM ABDOM COMPLETE: CPT

## 2017-08-23 PROCEDURE — 76700 US EXAM ABDOM COMPLETE: CPT | Mod: 26

## 2017-08-24 LAB
ALBUMIN SERPL ELPH-MCNC: 4.5 G/DL
ALP BLD-CCNC: 52 U/L
ALT SERPL-CCNC: 7 U/L
AST SERPL-CCNC: 17 U/L
BASOPHILS # BLD AUTO: 0.07 K/UL
BASOPHILS NFR BLD AUTO: 1.3 %
BILIRUB DIRECT SERPL-MCNC: 0.2 MG/DL
BILIRUB INDIRECT SERPL-MCNC: 0.3 MG/DL
BILIRUB SERPL-MCNC: 0.5 MG/DL
EOSINOPHIL # BLD AUTO: 0.11 K/UL
EOSINOPHIL NFR BLD AUTO: 2 %
HCT VFR BLD CALC: 34.8 %
HGB BLD-MCNC: 10.8 G/DL
IMM GRANULOCYTES NFR BLD AUTO: 0.2 %
LYMPHOCYTES # BLD AUTO: 2.69 K/UL
LYMPHOCYTES NFR BLD AUTO: 48.9 %
MAN DIFF?: NORMAL
MCHC RBC-ENTMCNC: 18.2 PG
MCHC RBC-ENTMCNC: 31 GM/DL
MCV RBC AUTO: 58.5 FL
MONOCYTES # BLD AUTO: 0.53 K/UL
MONOCYTES NFR BLD AUTO: 9.6 %
NEUTROPHILS # BLD AUTO: 2.09 K/UL
NEUTROPHILS NFR BLD AUTO: 38 %
PLATELET # BLD AUTO: 294 K/UL
PROT SERPL-MCNC: 8.1 G/DL
RBC # BLD: 5.95 M/UL
RBC # FLD: 14.9 %
WBC # FLD AUTO: 5.5 K/UL

## 2017-08-25 LAB
B BURGDOR AB SER-IMP: NEGATIVE
B BURGDOR IGG+IGM SER QL: 0.3 INDEX

## 2017-09-05 LAB
B BURGDOR AB SER-IMP: POSITIVE
B BURGDOR IGM PATRN SER IB-IMP: POSITIVE
B BURGDOR18/20KD IGM SER QL IB: NORMAL
B BURGDOR18KD IGG SER QL IB: NORMAL
B BURGDOR23KD IGG SER QL IB: NORMAL
B BURGDOR23KD IGM SER QL IB: PRESENT
B BURGDOR28KD AB SER QL IB: NORMAL
B BURGDOR28KD IGG SER QL IB: PRESENT
B BURGDOR30KD AB SER QL IB: NORMAL
B BURGDOR30KD IGG SER QL IB: NORMAL
B BURGDOR31KD IGG SER QL IB: NORMAL
B BURGDOR31KD IGM SER QL IB: NORMAL
B BURGDOR39KD IGG SER QL IB: PRESENT
B BURGDOR39KD IGM SER QL IB: NORMAL
B BURGDOR41KD IGG SER QL IB: PRESENT
B BURGDOR41KD IGM SER QL IB: PRESENT
B BURGDOR45KD AB SER QL IB: NORMAL
B BURGDOR45KD IGG SER QL IB: PRESENT
B BURGDOR58KD AB SER QL IB: NORMAL
B BURGDOR58KD IGG SER QL IB: PRESENT
B BURGDOR66KD IGG SER QL IB: PRESENT
B BURGDOR66KD IGM SER QL IB: NORMAL
B BURGDOR93KD IGG SER QL IB: NORMAL
B BURGDOR93KD IGM SER QL IB: NORMAL

## 2017-10-16 ENCOUNTER — APPOINTMENT (OUTPATIENT)
Dept: FAMILY MEDICINE | Facility: CLINIC | Age: 22
End: 2017-10-16
Payer: MEDICAID

## 2017-10-16 PROCEDURE — G0008: CPT

## 2017-10-16 PROCEDURE — 90686 IIV4 VACC NO PRSV 0.5 ML IM: CPT

## 2018-01-08 ENCOUNTER — OUTPATIENT (OUTPATIENT)
Dept: OUTPATIENT SERVICES | Facility: HOSPITAL | Age: 23
LOS: 1 days | End: 2018-01-08
Payer: MEDICAID

## 2018-01-08 DIAGNOSIS — Z00.00 ENCOUNTER FOR GENERAL ADULT MEDICAL EXAMINATION WITHOUT ABNORMAL FINDINGS: ICD-10-CM

## 2018-01-08 DIAGNOSIS — B27.10 CYTOMEGALOVIRAL MONONUCLEOSIS WITHOUT COMPLICATIONS: ICD-10-CM

## 2018-01-08 DIAGNOSIS — B27.90 INFECTIOUS MONONUCLEOSIS, UNSPECIFIED WITHOUT COMPLICATION: ICD-10-CM

## 2018-01-08 PROCEDURE — 76700 US EXAM ABDOM COMPLETE: CPT | Mod: 26

## 2018-01-08 PROCEDURE — 76700 US EXAM ABDOM COMPLETE: CPT

## 2018-08-15 ENCOUNTER — APPOINTMENT (OUTPATIENT)
Dept: FAMILY MEDICINE | Facility: CLINIC | Age: 23
End: 2018-08-15
Payer: MEDICAID

## 2018-08-15 VITALS
BODY MASS INDEX: 18.55 KG/M2 | OXYGEN SATURATION: 100 % | RESPIRATION RATE: 14 BRPM | HEART RATE: 84 BPM | DIASTOLIC BLOOD PRESSURE: 79 MMHG | SYSTOLIC BLOOD PRESSURE: 96 MMHG | WEIGHT: 106 LBS | HEIGHT: 63.5 IN

## 2018-08-15 PROCEDURE — 99213 OFFICE O/P EST LOW 20 MIN: CPT

## 2018-08-16 ENCOUNTER — FORM ENCOUNTER (OUTPATIENT)
Age: 23
End: 2018-08-16

## 2018-08-16 ENCOUNTER — LABORATORY RESULT (OUTPATIENT)
Age: 23
End: 2018-08-16

## 2018-08-17 ENCOUNTER — OUTPATIENT (OUTPATIENT)
Dept: OUTPATIENT SERVICES | Facility: HOSPITAL | Age: 23
LOS: 1 days | End: 2018-08-17
Payer: MEDICAID

## 2018-08-17 DIAGNOSIS — R10.9 UNSPECIFIED ABDOMINAL PAIN: ICD-10-CM

## 2018-08-17 LAB
24R-OH-CALCIDIOL SERPL-MCNC: 39 PG/ML
ALBUMIN SERPL ELPH-MCNC: 4.6 G/DL
ALP BLD-CCNC: 38 U/L
ALT SERPL-CCNC: 8 U/L
ANION GAP SERPL CALC-SCNC: 12 MMOL/L
AST SERPL-CCNC: 19 U/L
BASOPHILS # BLD AUTO: 0.05 K/UL
BASOPHILS NFR BLD AUTO: 0.7 %
BILIRUB SERPL-MCNC: 0.8 MG/DL
BUN SERPL-MCNC: 9 MG/DL
CALCIUM SERPL-MCNC: 9.6 MG/DL
CHLORIDE SERPL-SCNC: 102 MMOL/L
CHOLEST SERPL-MCNC: 110 MG/DL
CHOLEST/HDLC SERPL: 2.2 RATIO
CO2 SERPL-SCNC: 24 MMOL/L
CREAT SERPL-MCNC: 0.74 MG/DL
EOSINOPHIL # BLD AUTO: 0.25 K/UL
EOSINOPHIL NFR BLD AUTO: 3.4 %
GLIADIN IGA SER QL: <5 UNITS
GLIADIN IGG SER QL: 5.9 UNITS
GLIADIN PEPTIDE IGA SER-ACNC: NEGATIVE
GLIADIN PEPTIDE IGG SER-ACNC: NEGATIVE
GLUCOSE SERPL-MCNC: 80 MG/DL
HBA1C MFR BLD HPLC: 4.8 %
HCT VFR BLD CALC: 35.9 %
HDLC SERPL-MCNC: 49 MG/DL
HGB BLD-MCNC: 11.3 G/DL
IGA SER QL IEP: 153 MG/DL
IMM GRANULOCYTES NFR BLD AUTO: 0.3 %
LDLC SERPL CALC-MCNC: 52 MG/DL
LYMPHOCYTES # BLD AUTO: 2.73 K/UL
LYMPHOCYTES NFR BLD AUTO: 36.7 %
MAN DIFF?: NORMAL
MCHC RBC-ENTMCNC: 19.1 PG
MCHC RBC-ENTMCNC: 31.5 GM/DL
MCV RBC AUTO: 60.6 FL
MONOCYTES # BLD AUTO: 0.59 K/UL
MONOCYTES NFR BLD AUTO: 7.9 %
NEUTROPHILS # BLD AUTO: 3.8 K/UL
NEUTROPHILS NFR BLD AUTO: 51 %
PLATELET # BLD AUTO: 302 K/UL
POTASSIUM SERPL-SCNC: 3.8 MMOL/L
PROT SERPL-MCNC: 6.9 G/DL
RBC # BLD: 5.92 M/UL
RBC # FLD: 14.9 %
SODIUM SERPL-SCNC: 138 MMOL/L
TRIGL SERPL-MCNC: 46 MG/DL
TSH SERPL-ACNC: 1.27 UIU/ML
TTG IGA SER IA-ACNC: <5 UNITS
TTG IGA SER-ACNC: NEGATIVE
TTG IGG SER IA-ACNC: <5 UNITS
TTG IGG SER IA-ACNC: NEGATIVE
VIT B12 SERPL-MCNC: 543 PG/ML
WBC # FLD AUTO: 7.44 K/UL

## 2018-08-17 PROCEDURE — 76700 US EXAM ABDOM COMPLETE: CPT

## 2018-08-17 PROCEDURE — 76700 US EXAM ABDOM COMPLETE: CPT | Mod: 26

## 2018-08-18 LAB
ENDOMYSIUM IGA SER QL: NEGATIVE
ENDOMYSIUM IGA TITR SER: NORMAL
H PYLORI AG STL QL: NEGATIVE

## 2018-08-20 NOTE — HISTORY OF PRESENT ILLNESS
[FreeTextEntry1] : epigastric pain [de-identified] : epigastric pain - radiates to back\par no chest pain or sob. no alleviating or aggrevating factors\par no voiding issues and BM normal

## 2018-08-20 NOTE — HEALTH RISK ASSESSMENT
[Intercurrent ED visits] : went to ED [] : No [No falls in past year] : Patient reported no falls in the past year [0] : 1) Little interest or pleasure doing things: Not at all (0) [[id number]] : 0

## 2018-08-22 ENCOUNTER — APPOINTMENT (OUTPATIENT)
Dept: FAMILY MEDICINE | Facility: CLINIC | Age: 23
End: 2018-08-22
Payer: MEDICAID

## 2018-08-22 VITALS
SYSTOLIC BLOOD PRESSURE: 110 MMHG | RESPIRATION RATE: 14 BRPM | OXYGEN SATURATION: 98 % | BODY MASS INDEX: 18.55 KG/M2 | HEART RATE: 83 BPM | WEIGHT: 106 LBS | DIASTOLIC BLOOD PRESSURE: 60 MMHG | HEIGHT: 63.5 IN

## 2018-08-22 PROCEDURE — 99395 PREV VISIT EST AGE 18-39: CPT

## 2018-08-23 NOTE — HISTORY OF PRESENT ILLNESS
[FreeTextEntry1] : CPE [de-identified] : 23 year old female here for CPE and discuss results of labs>\par Reports that she still has some epigastric pain but no chest pain or \par sob no fever voiding po. Doing well in school \par Pain in epigastric area no radiation no nvd ass with it. sometimes\par associated with meal.

## 2018-08-23 NOTE — HEALTH RISK ASSESSMENT
[] : No [No falls in past year] : Patient reported no falls in the past year [0] : 2) Feeling down, depressed, or hopeless: Not at all (0) [[id number]] : 0

## 2018-08-23 NOTE — COUNSELING
[Weight management counseling provided] : Weight management [Healthy eating counseling provided] : healthy eating [Activity counseling provided] : activity [Behavioral health counseling provided] : behavioral health  [Transportation Issues] : Transportation issues [None] : None [Good understanding] : Patient has a good understanding of lifestyle changes and the steps needed to achieve self management goals

## 2018-10-15 ENCOUNTER — APPOINTMENT (OUTPATIENT)
Dept: FAMILY MEDICINE | Facility: CLINIC | Age: 23
End: 2018-10-15
Payer: MEDICAID

## 2018-10-15 VITALS — TEMPERATURE: 97.8 F

## 2018-10-15 PROCEDURE — G0008: CPT

## 2018-10-15 PROCEDURE — 90686 IIV4 VACC NO PRSV 0.5 ML IM: CPT

## 2019-04-24 NOTE — ED ADULT NURSE NOTE - CHIEF COMPLAINT
Pt was called and informed that she should take the 88 mcg. Thanks The patient is a 21y Female complaining of

## 2019-06-03 ENCOUNTER — APPOINTMENT (OUTPATIENT)
Dept: FAMILY MEDICINE | Facility: CLINIC | Age: 24
End: 2019-06-03
Payer: MEDICAID

## 2019-06-03 PROCEDURE — 86580 TB INTRADERMAL TEST: CPT

## 2019-08-14 ENCOUNTER — APPOINTMENT (OUTPATIENT)
Dept: GASTROENTEROLOGY | Facility: CLINIC | Age: 24
End: 2019-08-14
Payer: MEDICAID

## 2019-08-14 VITALS
OXYGEN SATURATION: 99 % | SYSTOLIC BLOOD PRESSURE: 102 MMHG | DIASTOLIC BLOOD PRESSURE: 62 MMHG | HEART RATE: 112 BPM | TEMPERATURE: 98.3 F | BODY MASS INDEX: 18.27 KG/M2 | WEIGHT: 107 LBS | HEIGHT: 64 IN

## 2019-08-14 PROCEDURE — 99204 OFFICE O/P NEW MOD 45 MIN: CPT

## 2019-08-14 RX ORDER — NITROFURANTOIN (MONOHYDRATE/MACROCRYSTALS) 25; 75 MG/1; MG/1
100 CAPSULE ORAL
Qty: 14 | Refills: 0 | Status: DISCONTINUED | COMMUNITY
Start: 2018-06-05 | End: 2019-08-14

## 2019-08-14 RX ORDER — HYDROCODONE BITARTRATE AND HOMATROPINE METHYLBROMIDE 5; 1.5 MG/5ML; MG/5ML
5-1.5 SYRUP ORAL
Qty: 120 | Refills: 0 | Status: DISCONTINUED | COMMUNITY
Start: 2017-07-21 | End: 2019-08-14

## 2019-08-14 NOTE — ASSESSMENT
[FreeTextEntry1] : Impression is that of a female who likely has a functional bowel disorder, possibly post infectious given a history of mononucleosis and exposure to antibiotics.\par \par I have spent a great deal of time discussing the role of daily exercise with the patient. We discussed lifestyle modification and the merits of brief, exertional efforts. I have discussed nutrition in great detail including consuming vegetable fibers on a daily basis.  We have also reviewed the benefits of soluble fiber supplementation, including (but not limited to), favorable effects on lipid profile, weight control, decreasing the risk of cardiovascular disease and the salutary effects on colonic microbiota.\par \par Patient to make these efforts and return to my office for further evaluation.\par \par (Incidentally, the patient has known small gallbladder polyps which are inconsequential.  She should have her mild anemia followed and worked up if it persists or worsens, though there is mention of thalassemia in chart.)

## 2019-08-14 NOTE — HISTORY OF PRESENT ILLNESS
[de-identified] : The patient reports that she was in her normal good health until approximately 8 months ago when she started having alternating constipation and diarrhea. She denies rectal bleeding. She sometimes gets intermittent abdominal cramping. She The food are but did not notice any pattern to her symptoms. She is physically active during high school but not so much during college. She is currently about to start a new job is under some degree of stress. She denies heartburn, odynophagia, dysphagia or subsided. Her weight has been stable. She has alternating constipation and diarrhea. She denies any unusual joint pains or rashes.\par \par Blood work does reveal a chronic mild anemia. Serologic testing for some sprue was negative and the patient reports that she had stool tests that were supposedly also negative.

## 2019-08-14 NOTE — PHYSICAL EXAM
[General Appearance - Alert] : alert [General Appearance - In No Acute Distress] : in no acute distress [Sclera] : the sclera and conjunctiva were normal [PERRL With Normal Accommodation] : pupils were equal in size, round, and reactive to light [Extraocular Movements] : extraocular movements were intact [Outer Ear] : the ears and nose were normal in appearance [Oropharynx] : the oropharynx was normal [Neck Appearance] : the appearance of the neck was normal [Neck Cervical Mass (___cm)] : no neck mass was observed [Jugular Venous Distention Increased] : there was no jugular-venous distention [Thyroid Diffuse Enlargement] : the thyroid was not enlarged [Thyroid Nodule] : there were no palpable thyroid nodules [Auscultation Breath Sounds / Voice Sounds] : lungs were clear to auscultation bilaterally [Heart Rate And Rhythm] : heart rate was normal and rhythm regular [Heart Sounds] : normal S1 and S2 [Heart Sounds Gallop] : no gallops [Murmurs] : no murmurs [Edema] : there was no peripheral edema [Heart Sounds Pericardial Friction Rub] : no pericardial rub [Bowel Sounds] : normal bowel sounds [Abdomen Soft] : soft [Abdomen Tenderness] : non-tender [Abdomen Mass (___ Cm)] : no abdominal mass palpated [Cervical Lymph Nodes Enlarged Posterior Bilaterally] : posterior cervical [Cervical Lymph Nodes Enlarged Anterior Bilaterally] : anterior cervical [No CVA Tenderness] : no ~M costovertebral angle tenderness [Abnormal Walk] : normal gait [No Spinal Tenderness] : no spinal tenderness [Musculoskeletal - Swelling] : no joint swelling seen [Nail Clubbing] : no clubbing  or cyanosis of the fingernails [Skin Color & Pigmentation] : normal skin color and pigmentation [Motor Tone] : muscle strength and tone were normal [Skin Turgor] : normal skin turgor [] : no rash [No Focal Deficits] : no focal deficits [Oriented To Time, Place, And Person] : oriented to person, place, and time [Impaired Insight] : insight and judgment were intact [Affect] : the affect was normal

## 2019-08-15 ENCOUNTER — APPOINTMENT (OUTPATIENT)
Dept: OBGYN | Facility: CLINIC | Age: 24
End: 2019-08-15
Payer: MEDICAID

## 2019-08-15 VITALS
WEIGHT: 107 LBS | HEIGHT: 64 IN | OXYGEN SATURATION: 98 % | SYSTOLIC BLOOD PRESSURE: 102 MMHG | DIASTOLIC BLOOD PRESSURE: 66 MMHG | BODY MASS INDEX: 18.27 KG/M2 | HEART RATE: 95 BPM

## 2019-08-15 DIAGNOSIS — Z87.09 PERSONAL HISTORY OF OTHER DISEASES OF THE RESPIRATORY SYSTEM: ICD-10-CM

## 2019-08-15 DIAGNOSIS — Z87.898 PERSONAL HISTORY OF OTHER SPECIFIED CONDITIONS: ICD-10-CM

## 2019-08-15 DIAGNOSIS — B30.9 VIRAL CONJUNCTIVITIS, UNSPECIFIED: ICD-10-CM

## 2019-08-15 DIAGNOSIS — Z87.2 PERSONAL HISTORY OF DISEASES OF THE SKIN AND SUBCUTANEOUS TISSUE: ICD-10-CM

## 2019-08-15 DIAGNOSIS — Z87.440 PERSONAL HISTORY OF URINARY (TRACT) INFECTIONS: ICD-10-CM

## 2019-08-15 DIAGNOSIS — Z78.9 OTHER SPECIFIED HEALTH STATUS: ICD-10-CM

## 2019-08-15 DIAGNOSIS — H10.45 OTHER CHRONIC ALLERGIC CONJUNCTIVITIS: ICD-10-CM

## 2019-08-15 DIAGNOSIS — Z20.828 CONTACT WITH AND (SUSPECTED) EXPOSURE TO OTHER VIRAL COMMUNICABLE DISEASES: ICD-10-CM

## 2019-08-15 DIAGNOSIS — Z11.3 ENCOUNTER FOR SCREENING FOR INFECTIONS WITH A PREDOMINANTLY SEXUAL MODE OF TRANSMISSION: ICD-10-CM

## 2019-08-15 DIAGNOSIS — Z87.42 PERSONAL HISTORY OF OTHER DISEASES OF THE FEMALE GENITAL TRACT: ICD-10-CM

## 2019-08-15 PROCEDURE — 99385 PREV VISIT NEW AGE 18-39: CPT

## 2019-08-15 NOTE — CHIEF COMPLAINT
[Annual Visit] : annual visit [FreeTextEntry1] : Pt mom  referred to me(my pt)Working at  --[employer]. 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

## 2019-08-15 NOTE — HISTORY OF PRESENT ILLNESS
[Good] : being in good health [Reproductive Age] : is of reproductive age [Menstrual Problems] : reports normal menses [Contraception] : uses contraception [Condoms] : uses condoms [Fever] : no fever [de-identified] : REFUSES Gardasil [Sexually Active] : is sexually active [Nausea] : no nausea [Vomiting] : no vomiting [Diarrhea] : no diarrhea [Vaginal Bleeding] : no vaginal bleeding [Pelvic Pressure] : no pelvic pressure [Dysuria] : no dysuria [No] : no

## 2019-08-17 LAB
C TRACH RRNA SPEC QL NAA+PROBE: NOT DETECTED
HPV HIGH+LOW RISK DNA PNL CVX: NOT DETECTED
N GONORRHOEA RRNA SPEC QL NAA+PROBE: NOT DETECTED
SOURCE TP AMPLIFICATION: NORMAL

## 2019-08-22 LAB — CYTOLOGY CVX/VAG DOC THIN PREP: ABNORMAL

## 2019-08-26 ENCOUNTER — APPOINTMENT (OUTPATIENT)
Dept: FAMILY MEDICINE | Facility: CLINIC | Age: 24
End: 2019-08-26
Payer: MEDICAID

## 2019-08-26 ENCOUNTER — LABORATORY RESULT (OUTPATIENT)
Age: 24
End: 2019-08-26

## 2019-08-26 VITALS
OXYGEN SATURATION: 96 % | HEART RATE: 76 BPM | RESPIRATION RATE: 15 BRPM | HEIGHT: 64 IN | BODY MASS INDEX: 18.78 KG/M2 | SYSTOLIC BLOOD PRESSURE: 110 MMHG | WEIGHT: 110 LBS | DIASTOLIC BLOOD PRESSURE: 80 MMHG | TEMPERATURE: 98 F

## 2019-08-26 LAB
ALBUMIN SERPL ELPH-MCNC: 4.9 G/DL
ALP BLD-CCNC: 48 U/L
ALT SERPL-CCNC: 9 U/L
ANION GAP SERPL CALC-SCNC: 11 MMOL/L
AST SERPL-CCNC: 12 U/L
BASOPHILS # BLD AUTO: 0.21 K/UL
BASOPHILS NFR BLD AUTO: 3.5 %
BILIRUB SERPL-MCNC: 0.4 MG/DL
BUN SERPL-MCNC: 10 MG/DL
CALCIUM SERPL-MCNC: 10 MG/DL
CHLORIDE SERPL-SCNC: 103 MMOL/L
CHOLEST SERPL-MCNC: 123 MG/DL
CHOLEST/HDLC SERPL: 2.4 RATIO
CO2 SERPL-SCNC: 27 MMOL/L
CREAT SERPL-MCNC: 0.79 MG/DL
EOSINOPHIL # BLD AUTO: 0.15 K/UL
EOSINOPHIL NFR BLD AUTO: 2.6 %
GLUCOSE SERPL-MCNC: 81 MG/DL
HBV SURFACE AB SER QL: REACTIVE
HCT VFR BLD CALC: 37.9 %
HDLC SERPL-MCNC: 51 MG/DL
HGB BLD-MCNC: 11.7 G/DL
LDLC SERPL CALC-MCNC: 56 MG/DL
LYMPHOCYTES # BLD AUTO: 1.52 K/UL
LYMPHOCYTES NFR BLD AUTO: 25.7 %
MAN DIFF?: NORMAL
MCHC RBC-ENTMCNC: 19.3 PG
MCHC RBC-ENTMCNC: 30.9 GM/DL
MCV RBC AUTO: 62.4 FL
MONOCYTES # BLD AUTO: 0.16 K/UL
MONOCYTES NFR BLD AUTO: 2.7 %
NEUTROPHILS # BLD AUTO: 3.88 K/UL
NEUTROPHILS NFR BLD AUTO: 65.5 %
PLATELET # BLD AUTO: 313 K/UL
POTASSIUM SERPL-SCNC: 4.2 MMOL/L
PROT SERPL-MCNC: 7.1 G/DL
RBC # BLD: 6.07 M/UL
RBC # FLD: 14.6 %
SODIUM SERPL-SCNC: 141 MMOL/L
TRIGL SERPL-MCNC: 81 MG/DL
TSH SERPL-ACNC: 1.15 UIU/ML
WBC # FLD AUTO: 5.92 K/UL

## 2019-08-26 PROCEDURE — 99395 PREV VISIT EST AGE 18-39: CPT

## 2019-08-27 LAB
MEV IGG FLD QL IA: >300 AU/ML
MEV IGG+IGM SER-IMP: POSITIVE
MUV AB SER-ACNC: POSITIVE
MUV IGG SER QL IA: 163 AU/ML
RUBV IGG FLD-ACNC: 4.1 INDEX
RUBV IGG SER-IMP: POSITIVE
VZV AB TITR SER: NEGATIVE
VZV IGG SER IF-ACNC: 129.9 INDEX

## 2019-08-27 NOTE — COUNSELING
[Fall prevention counseling provided] : Fall prevention counseling provided [Behavioral health counseling provided] : Behavioral health counseling provided [Potential consequences of obesity discussed] : Potential consequences of obesity discussed [Encouraged to maintain food diary] : Encouraged to maintain food diary [None] : None [Good understanding] : Patient has a good understanding of lifestyle changes and steps needed to achieve self management goal

## 2019-08-27 NOTE — HEALTH RISK ASSESSMENT
[] : No [0-5] : 0-5 [No] : No [No falls in past year] : Patient reported no falls in the past year [0] : 2) Feeling down, depressed, or hopeless: Not at all (0) [[id number]] : 0

## 2019-08-27 NOTE — HISTORY OF PRESENT ILLNESS
[FreeTextEntry1] : CPE [de-identified] : Patient here for CPE. Doing well overall. No chest pain or sob. Just had fu with obgyn and pap negative. \par Reports some continued GI upset for which she went to a GI specialist. Possible endoscopy and colonoscopy\par to be performed. Just got a job a a  so will need titers.

## 2019-08-27 NOTE — PHYSICAL EXAM
[No Acute Distress] : no acute distress [Well Nourished] : well nourished [Well Developed] : well developed [Well-Appearing] : well-appearing [Normal Sclera/Conjunctiva] : normal sclera/conjunctiva [PERRL] : pupils equal round and reactive to light [EOMI] : extraocular movements intact [Normal Outer Ear/Nose] : the outer ears and nose were normal in appearance [Normal Oropharynx] : the oropharynx was normal [No JVD] : no jugular venous distention [No Lymphadenopathy] : no lymphadenopathy [Supple] : supple [Thyroid Normal, No Nodules] : the thyroid was normal and there were no nodules present [No Respiratory Distress] : no respiratory distress  [No Accessory Muscle Use] : no accessory muscle use [Clear to Auscultation] : lungs were clear to auscultation bilaterally [Normal Rate] : normal rate  [Regular Rhythm] : with a regular rhythm [Normal S1, S2] : normal S1 and S2 [No Murmur] : no murmur heard [No Carotid Bruits] : no carotid bruits [No Abdominal Bruit] : a ~M bruit was not heard ~T in the abdomen [No Varicosities] : no varicosities [Pedal Pulses Present] : the pedal pulses are present [No Edema] : there was no peripheral edema [No Palpable Aorta] : no palpable aorta [Soft] : abdomen soft [No Extremity Clubbing/Cyanosis] : no extremity clubbing/cyanosis [Non Tender] : non-tender [No Masses] : no abdominal mass palpated [Non-distended] : non-distended [No HSM] : no HSM [Normal Posterior Cervical Nodes] : no posterior cervical lymphadenopathy [Normal Bowel Sounds] : normal bowel sounds [Normal Anterior Cervical Nodes] : no anterior cervical lymphadenopathy [No CVA Tenderness] : no CVA  tenderness [No Spinal Tenderness] : no spinal tenderness [No Joint Swelling] : no joint swelling [Grossly Normal Strength/Tone] : grossly normal strength/tone [No Rash] : no rash [No Focal Deficits] : no focal deficits [Coordination Grossly Intact] : coordination grossly intact [Normal Gait] : normal gait [Deep Tendon Reflexes (DTR)] : deep tendon reflexes were 2+ and symmetric [Normal Affect] : the affect was normal [Normal Insight/Judgement] : insight and judgment were intact

## 2019-08-29 ENCOUNTER — APPOINTMENT (OUTPATIENT)
Dept: FAMILY MEDICINE | Facility: CLINIC | Age: 24
End: 2019-08-29

## 2019-08-30 ENCOUNTER — APPOINTMENT (OUTPATIENT)
Dept: FAMILY MEDICINE | Facility: CLINIC | Age: 24
End: 2019-08-30
Payer: MEDICAID

## 2019-08-30 PROCEDURE — 90471 IMMUNIZATION ADMIN: CPT

## 2019-08-30 PROCEDURE — 90716 VAR VACCINE LIVE SUBQ: CPT

## 2019-09-30 ENCOUNTER — APPOINTMENT (OUTPATIENT)
Dept: FAMILY MEDICINE | Facility: CLINIC | Age: 24
End: 2019-09-30
Payer: COMMERCIAL

## 2019-09-30 PROCEDURE — G0008: CPT

## 2019-09-30 PROCEDURE — 90686 IIV4 VACC NO PRSV 0.5 ML IM: CPT

## 2019-12-24 ENCOUNTER — APPOINTMENT (OUTPATIENT)
Dept: FAMILY MEDICINE | Facility: CLINIC | Age: 24
End: 2019-12-24
Payer: COMMERCIAL

## 2019-12-24 VITALS
DIASTOLIC BLOOD PRESSURE: 70 MMHG | HEIGHT: 64 IN | RESPIRATION RATE: 16 BRPM | WEIGHT: 106 LBS | BODY MASS INDEX: 18.1 KG/M2 | TEMPERATURE: 98.6 F | HEART RATE: 96 BPM | SYSTOLIC BLOOD PRESSURE: 108 MMHG | OXYGEN SATURATION: 99 %

## 2019-12-24 PROCEDURE — 99214 OFFICE O/P EST MOD 30 MIN: CPT

## 2019-12-24 NOTE — ASSESSMENT
[FreeTextEntry1] : symptomatic treatment rest hydration and OTC meds d/w pt \par sent cx call for results

## 2019-12-24 NOTE — PHYSICAL EXAM
[de-identified] : mild tonsilar erythema no plaques membranes or exudates cx taken  [Normal] : no rash

## 2019-12-24 NOTE — HISTORY OF PRESENT ILLNESS
[___ Days ago] : [unfilled] days ago [Moderate] : moderate [Constant] : constant [Congestion] : congestion [Cough] : cough [Sore Throat] : sore throat [Chills] : chills [Headache] : headache [Fatigue] : fatigue [OTC Remedies] : OTC remedies [Fever] : fever [Tmax= ___] : Tmax = [unfilled] [In Morning] : in the morning [At Night] : at night [Worsening] : worsening [Wheezing] : no wheezing [Anorexia] : no anorexia [Shortness Of Breath] : no shortness of breath [Earache] : no earache [FreeTextEntry5] : advil  [FreeTextEntry8] : 23 y/o  here with c/o 2 days fevers chills cough congestion and sore throat states also hoarseness voice. pt states also pain over eyes since this morning and more congested.  Pt denies CP SOB palpitation or dizziness feels when coughs something has to come out and nothing does. Denies enlarged nodes + cough nonproductive

## 2019-12-24 NOTE — REVIEW OF SYSTEMS
[Fever] : fever [Chills] : chills [Fatigue] : fatigue [Hot Flashes] : no hot flashes [Night Sweats] : no night sweats [Recent Change In Weight] : ~T no recent weight change [Earache] : no earache [Nosebleed] : no nosebleeds [Hearing Loss] : no hearing loss [Sore Throat] : sore throat [Hoarseness] : hoarseness [Cough] : cough [Postnasal Drip] : postnasal drip [Dizziness] : no dizziness [Headache] : headache [Confusion] : no confusion [Fainting] : no fainting [Memory Loss] : no memory loss [Unsteady Walking] : no ataxia [FreeTextEntry3] : pain frontal region head obave bothe eyes [Negative] : Heme/Lymph

## 2019-12-25 ENCOUNTER — TRANSCRIPTION ENCOUNTER (OUTPATIENT)
Age: 24
End: 2019-12-25

## 2020-01-03 ENCOUNTER — APPOINTMENT (OUTPATIENT)
Dept: FAMILY MEDICINE | Facility: CLINIC | Age: 25
End: 2020-01-03
Payer: COMMERCIAL

## 2020-01-03 VITALS
DIASTOLIC BLOOD PRESSURE: 70 MMHG | RESPIRATION RATE: 16 BRPM | OXYGEN SATURATION: 98 % | HEIGHT: 64 IN | SYSTOLIC BLOOD PRESSURE: 100 MMHG | WEIGHT: 108 LBS | TEMPERATURE: 98.4 F | HEART RATE: 78 BPM | BODY MASS INDEX: 18.44 KG/M2

## 2020-01-03 PROCEDURE — 99214 OFFICE O/P EST MOD 30 MIN: CPT

## 2020-01-06 NOTE — COUNSELING
[Fall prevention counseling provided] : Fall prevention counseling provided [Engage in a relaxing activity] : Engage in a relaxing activity [Behavioral health counseling provided] : Behavioral health counseling provided [Good understanding] : Patient has a good understanding of lifestyle changes and steps needed to achieve self management goal [None] : None

## 2020-01-06 NOTE — PHYSICAL EXAM
[No Acute Distress] : no acute distress [Well Developed] : well developed [Well Nourished] : well nourished [Normal Sclera/Conjunctiva] : normal sclera/conjunctiva [PERRL] : pupils equal round and reactive to light [Well-Appearing] : well-appearing [Normal Outer Ear/Nose] : the outer ears and nose were normal in appearance [EOMI] : extraocular movements intact [No JVD] : no jugular venous distention [No Lymphadenopathy] : no lymphadenopathy [Normal Oropharynx] : the oropharynx was normal [Supple] : supple [No Respiratory Distress] : no respiratory distress  [Thyroid Normal, No Nodules] : the thyroid was normal and there were no nodules present [No Accessory Muscle Use] : no accessory muscle use [Regular Rhythm] : with a regular rhythm [Normal Rate] : normal rate  [Normal S1, S2] : normal S1 and S2 [No Murmur] : no murmur heard [No Carotid Bruits] : no carotid bruits [No Abdominal Bruit] : a ~M bruit was not heard ~T in the abdomen [No Varicosities] : no varicosities [Pedal Pulses Present] : the pedal pulses are present [No Edema] : there was no peripheral edema [No Palpable Aorta] : no palpable aorta [Soft] : abdomen soft [Non Tender] : non-tender [No Extremity Clubbing/Cyanosis] : no extremity clubbing/cyanosis [Non-distended] : non-distended [No Masses] : no abdominal mass palpated [No HSM] : no HSM [Normal Posterior Cervical Nodes] : no posterior cervical lymphadenopathy [Normal Anterior Cervical Nodes] : no anterior cervical lymphadenopathy [Normal Bowel Sounds] : normal bowel sounds [No Spinal Tenderness] : no spinal tenderness [No Joint Swelling] : no joint swelling [No CVA Tenderness] : no CVA  tenderness [Coordination Grossly Intact] : coordination grossly intact [Grossly Normal Strength/Tone] : grossly normal strength/tone [No Rash] : no rash [Normal Gait] : normal gait [No Focal Deficits] : no focal deficits [Normal Insight/Judgement] : insight and judgment were intact [Normal Affect] : the affect was normal [Deep Tendon Reflexes (DTR)] : deep tendon reflexes were 2+ and symmetric [de-identified] : Marcelle LOZOYA

## 2020-01-06 NOTE — HISTORY OF PRESENT ILLNESS
[FreeTextEntry1] : Cough [de-identified] : 24 year old with cough and fever x 1 week, diagnosed with ? flu and placed on tamiflu. Coughing with vomiting daily. No chest pain or sob. Voiding well. No fever today.

## 2020-02-12 ENCOUNTER — APPOINTMENT (OUTPATIENT)
Dept: FAMILY MEDICINE | Facility: CLINIC | Age: 25
End: 2020-02-12
Payer: COMMERCIAL

## 2020-02-12 VITALS
TEMPERATURE: 98.4 F | BODY MASS INDEX: 18.78 KG/M2 | HEART RATE: 71 BPM | DIASTOLIC BLOOD PRESSURE: 70 MMHG | OXYGEN SATURATION: 98 % | RESPIRATION RATE: 15 BRPM | SYSTOLIC BLOOD PRESSURE: 104 MMHG | WEIGHT: 110 LBS | HEIGHT: 64 IN

## 2020-02-12 DIAGNOSIS — J04.0 ACUTE LARYNGITIS: ICD-10-CM

## 2020-02-12 PROCEDURE — 99213 OFFICE O/P EST LOW 20 MIN: CPT

## 2020-02-13 NOTE — HISTORY OF PRESENT ILLNESS
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 No sore throat. \par

## 2020-02-20 ENCOUNTER — APPOINTMENT (OUTPATIENT)
Dept: ULTRASOUND IMAGING | Facility: HOSPITAL | Age: 25
End: 2020-02-20

## 2020-02-26 ENCOUNTER — OUTPATIENT (OUTPATIENT)
Dept: OUTPATIENT SERVICES | Facility: HOSPITAL | Age: 25
LOS: 1 days | End: 2020-02-26
Payer: COMMERCIAL

## 2020-02-26 ENCOUNTER — APPOINTMENT (OUTPATIENT)
Dept: ULTRASOUND IMAGING | Facility: HOSPITAL | Age: 25
End: 2020-02-26
Payer: COMMERCIAL

## 2020-02-26 DIAGNOSIS — Z00.8 ENCOUNTER FOR OTHER GENERAL EXAMINATION: ICD-10-CM

## 2020-02-26 PROCEDURE — 76856 US EXAM PELVIC COMPLETE: CPT | Mod: 26

## 2020-02-26 PROCEDURE — 76856 US EXAM PELVIC COMPLETE: CPT

## 2020-07-13 ENCOUNTER — APPOINTMENT (OUTPATIENT)
Dept: DISASTER EMERGENCY | Facility: CLINIC | Age: 25
End: 2020-07-13

## 2020-07-14 LAB — SARS-COV-2 N GENE NPH QL NAA+PROBE: NOT DETECTED

## 2020-07-20 ENCOUNTER — OUTPATIENT (OUTPATIENT)
Dept: OUTPATIENT SERVICES | Facility: HOSPITAL | Age: 25
LOS: 1 days | End: 2020-07-20
Payer: COMMERCIAL

## 2020-07-20 ENCOUNTER — APPOINTMENT (OUTPATIENT)
Dept: ULTRASOUND IMAGING | Facility: HOSPITAL | Age: 25
End: 2020-07-20

## 2020-07-20 DIAGNOSIS — Z00.8 ENCOUNTER FOR OTHER GENERAL EXAMINATION: ICD-10-CM

## 2020-07-20 PROCEDURE — 76700 US EXAM ABDOM COMPLETE: CPT | Mod: 26

## 2020-07-20 PROCEDURE — 76700 US EXAM ABDOM COMPLETE: CPT

## 2020-08-27 ENCOUNTER — APPOINTMENT (OUTPATIENT)
Dept: FAMILY MEDICINE | Facility: CLINIC | Age: 25
End: 2020-08-27
Payer: COMMERCIAL

## 2020-08-27 VITALS
HEIGHT: 64 IN | BODY MASS INDEX: 18.78 KG/M2 | WEIGHT: 110 LBS | HEART RATE: 90 BPM | OXYGEN SATURATION: 99 % | RESPIRATION RATE: 16 BRPM | SYSTOLIC BLOOD PRESSURE: 100 MMHG | DIASTOLIC BLOOD PRESSURE: 60 MMHG | TEMPERATURE: 98 F

## 2020-08-27 DIAGNOSIS — Z00.00 ENCOUNTER FOR GENERAL ADULT MEDICAL EXAMINATION W/OUT ABNORMAL FINDINGS: ICD-10-CM

## 2020-08-27 PROCEDURE — 99395 PREV VISIT EST AGE 18-39: CPT

## 2020-08-27 RX ORDER — AZITHROMYCIN 250 MG/1
250 TABLET, FILM COATED ORAL
Qty: 1 | Refills: 0 | Status: DISCONTINUED | COMMUNITY
Start: 2020-02-12 | End: 2020-08-27

## 2020-08-27 RX ORDER — AZITHROMYCIN 250 MG/1
250 TABLET, FILM COATED ORAL
Qty: 1 | Refills: 0 | Status: DISCONTINUED | COMMUNITY
Start: 2020-01-04 | End: 2020-08-27

## 2020-08-27 RX ORDER — BENZONATATE 100 MG/1
100 CAPSULE ORAL
Qty: 21 | Refills: 0 | Status: DISCONTINUED | COMMUNITY
Start: 2020-01-04 | End: 2020-08-27

## 2020-08-28 LAB
ALBUMIN SERPL ELPH-MCNC: 5.2 G/DL
ALP BLD-CCNC: 47 U/L
ALT SERPL-CCNC: 7 U/L
ANION GAP SERPL CALC-SCNC: 11 MMOL/L
AST SERPL-CCNC: 11 U/L
BASOPHILS # BLD AUTO: 0.06 K/UL
BASOPHILS NFR BLD AUTO: 0.9 %
BILIRUB SERPL-MCNC: 0.5 MG/DL
BUN SERPL-MCNC: 12 MG/DL
CALCIUM SERPL-MCNC: 10.1 MG/DL
CHLORIDE SERPL-SCNC: 104 MMOL/L
CHOLEST SERPL-MCNC: 149 MG/DL
CHOLEST/HDLC SERPL: 2.7 RATIO
CO2 SERPL-SCNC: 26 MMOL/L
CREAT SERPL-MCNC: 1.01 MG/DL
EOSINOPHIL # BLD AUTO: 0.06 K/UL
EOSINOPHIL NFR BLD AUTO: 0.9 %
GLUCOSE SERPL-MCNC: 72 MG/DL
HCT VFR BLD CALC: 39.7 %
HDLC SERPL-MCNC: 56 MG/DL
HGB BLD-MCNC: 11.8 G/DL
IMM GRANULOCYTES NFR BLD AUTO: 0.3 %
LDLC SERPL CALC-MCNC: 78 MG/DL
LYMPHOCYTES # BLD AUTO: 1.98 K/UL
LYMPHOCYTES NFR BLD AUTO: 29.2 %
MAN DIFF?: NORMAL
MCHC RBC-ENTMCNC: 19 PG
MCHC RBC-ENTMCNC: 29.7 GM/DL
MCV RBC AUTO: 63.9 FL
MONOCYTES # BLD AUTO: 0.63 K/UL
MONOCYTES NFR BLD AUTO: 9.3 %
NEUTROPHILS # BLD AUTO: 4.02 K/UL
NEUTROPHILS NFR BLD AUTO: 59.4 %
PLATELET # BLD AUTO: 334 K/UL
POTASSIUM SERPL-SCNC: 4.4 MMOL/L
PROT SERPL-MCNC: 7.4 G/DL
RBC # BLD: 6.21 M/UL
RBC # FLD: 15.2 %
SARS-COV-2 IGG SERPL IA-ACNC: <3.8 AU/ML
SARS-COV-2 IGG SERPL QL IA: NEGATIVE
SODIUM SERPL-SCNC: 141 MMOL/L
TRIGL SERPL-MCNC: 72 MG/DL
TSH SERPL-ACNC: 0.72 UIU/ML
VIT B12 SERPL-MCNC: 563 PG/ML
WBC # FLD AUTO: 6.77 K/UL

## 2020-08-28 NOTE — PHYSICAL EXAM
[No Acute Distress] : no acute distress [Well Nourished] : well nourished [Well Developed] : well developed [Well-Appearing] : well-appearing [Normal Sclera/Conjunctiva] : normal sclera/conjunctiva [PERRL] : pupils equal round and reactive to light [EOMI] : extraocular movements intact [Normal Outer Ear/Nose] : the outer ears and nose were normal in appearance [Normal Oropharynx] : the oropharynx was normal [No JVD] : no jugular venous distention [No Lymphadenopathy] : no lymphadenopathy [Supple] : supple [Thyroid Normal, No Nodules] : the thyroid was normal and there were no nodules present [No Respiratory Distress] : no respiratory distress  [No Accessory Muscle Use] : no accessory muscle use [Clear to Auscultation] : lungs were clear to auscultation bilaterally [Normal Rate] : normal rate  [Regular Rhythm] : with a regular rhythm [Normal S1, S2] : normal S1 and S2 [No Murmur] : no murmur heard [No Carotid Bruits] : no carotid bruits [No Abdominal Bruit] : a ~M bruit was not heard ~T in the abdomen [No Varicosities] : no varicosities [Pedal Pulses Present] : the pedal pulses are present [No Palpable Aorta] : no palpable aorta [No Edema] : there was no peripheral edema [No Extremity Clubbing/Cyanosis] : no extremity clubbing/cyanosis [Soft] : abdomen soft [Non-distended] : non-distended [Non Tender] : non-tender [No Masses] : no abdominal mass palpated [No HSM] : no HSM [Normal Bowel Sounds] : normal bowel sounds [Normal Posterior Cervical Nodes] : no posterior cervical lymphadenopathy [Normal Anterior Cervical Nodes] : no anterior cervical lymphadenopathy [No CVA Tenderness] : no CVA  tenderness [No Spinal Tenderness] : no spinal tenderness [No Joint Swelling] : no joint swelling [Grossly Normal Strength/Tone] : grossly normal strength/tone [No Rash] : no rash [Coordination Grossly Intact] : coordination grossly intact [No Focal Deficits] : no focal deficits [Normal Affect] : the affect was normal [Normal Gait] : normal gait [Normal Insight/Judgement] : insight and judgment were intact

## 2020-08-28 NOTE — HEALTH RISK ASSESSMENT
[No] : No [] : No [No falls in past year] : Patient reported no falls in the past year [0] : 2) Feeling down, depressed, or hopeless: Not at all (0) [[id number]] : 0

## 2020-08-28 NOTE — HISTORY OF PRESENT ILLNESS
[FreeTextEntry1] : CPE [de-identified] : 25 year old female here for CPE\par Doing well, no chest pain or sob\par Voiding well no fever\par No cough\par No depression or anxiety

## 2020-10-12 ENCOUNTER — APPOINTMENT (OUTPATIENT)
Dept: FAMILY MEDICINE | Facility: CLINIC | Age: 25
End: 2020-10-12
Payer: COMMERCIAL

## 2020-10-12 VITALS — TEMPERATURE: 97.8 F

## 2020-10-12 PROCEDURE — 90686 IIV4 VACC NO PRSV 0.5 ML IM: CPT

## 2020-10-12 PROCEDURE — G0008: CPT

## 2020-10-16 NOTE — COUNSELING
[Vitamins/Supplements] : vitamins/supplements [Nutrition/ Exercise/ Weight Management] : nutrition, exercise, weight management [Breast Self Exam] : breast self exam

## 2020-10-16 NOTE — PHYSICAL EXAM
[No Acute Distress] : no acute distress [Appropriately responsive] : appropriately responsive [Alert] : alert [No Lymphadenopathy] : no lymphadenopathy [Soft] : soft [Non-tender] : non-tender [Non-distended] : non-distended [No HSM] : No HSM [No Lesions] : no lesions [Examination Of The Breasts] : a normal appearance [No Mass] : no mass [Oriented x3] : oriented x3 [No Masses] : no breast masses were palpable [Labia Majora] : normal [Labia Minora] : normal [Normal] : normal [Uterine Adnexae] : normal

## 2020-10-20 ENCOUNTER — APPOINTMENT (OUTPATIENT)
Dept: OBGYN | Facility: CLINIC | Age: 25
End: 2020-10-20
Payer: COMMERCIAL

## 2020-10-20 VITALS
TEMPERATURE: 97.1 F | WEIGHT: 110 LBS | SYSTOLIC BLOOD PRESSURE: 100 MMHG | DIASTOLIC BLOOD PRESSURE: 70 MMHG | OXYGEN SATURATION: 99 % | HEART RATE: 66 BPM

## 2020-10-20 DIAGNOSIS — Z01.419 ENCOUNTER FOR GYNECOLOGICAL EXAMINATION (GENERAL) (ROUTINE) W/OUT ABNORMAL FINDINGS: ICD-10-CM

## 2020-10-20 PROCEDURE — 99395 PREV VISIT EST AGE 18-39: CPT

## 2020-10-20 RX ORDER — IPRATROPIUM BROMIDE 42 UG/1
0.06 SPRAY NASAL 3 TIMES DAILY
Qty: 1 | Refills: 2 | Status: DISCONTINUED | COMMUNITY
Start: 2020-02-12 | End: 2020-10-20

## 2020-11-16 ENCOUNTER — TRANSCRIPTION ENCOUNTER (OUTPATIENT)
Age: 25
End: 2020-11-16

## 2020-12-15 ENCOUNTER — TRANSCRIPTION ENCOUNTER (OUTPATIENT)
Age: 25
End: 2020-12-15

## 2020-12-20 ENCOUNTER — TRANSCRIPTION ENCOUNTER (OUTPATIENT)
Age: 25
End: 2020-12-20

## 2020-12-23 PROBLEM — J04.0 ACUTE VIRAL LARYNGITIS: Status: RESOLVED | Noted: 2019-12-24 | Resolved: 2020-12-23

## 2020-12-23 PROBLEM — Z01.419 ENCOUNTER FOR ANNUAL ROUTINE GYNECOLOGICAL EXAMINATION: Status: RESOLVED | Noted: 2019-08-15 | Resolved: 2020-12-23

## 2021-05-03 ENCOUNTER — TRANSCRIPTION ENCOUNTER (OUTPATIENT)
Age: 26
End: 2021-05-03

## 2021-05-13 ENCOUNTER — TRANSCRIPTION ENCOUNTER (OUTPATIENT)
Age: 26
End: 2021-05-13

## 2021-09-01 ENCOUNTER — APPOINTMENT (OUTPATIENT)
Dept: FAMILY MEDICINE | Facility: CLINIC | Age: 26
End: 2021-09-01
Payer: COMMERCIAL

## 2021-09-01 VITALS
DIASTOLIC BLOOD PRESSURE: 70 MMHG | HEART RATE: 70 BPM | SYSTOLIC BLOOD PRESSURE: 110 MMHG | RESPIRATION RATE: 14 BRPM | TEMPERATURE: 96.9 F | WEIGHT: 112 LBS | OXYGEN SATURATION: 98 %

## 2021-09-01 DIAGNOSIS — Z01.818 ENCOUNTER FOR OTHER PREPROCEDURAL EXAMINATION: ICD-10-CM

## 2021-09-01 DIAGNOSIS — Z11.1 ENCOUNTER FOR SCREENING FOR RESPIRATORY TUBERCULOSIS: ICD-10-CM

## 2021-09-01 DIAGNOSIS — Z00.129 ENCOUNTER FOR ROUTINE CHILD HEALTH EXAMINATION W/OUT ABNORMAL FINDINGS: ICD-10-CM

## 2021-09-01 DIAGNOSIS — Z23 ENCOUNTER FOR IMMUNIZATION: ICD-10-CM

## 2021-09-01 DIAGNOSIS — Z87.09 PERSONAL HISTORY OF OTHER DISEASES OF THE RESPIRATORY SYSTEM: ICD-10-CM

## 2021-09-01 DIAGNOSIS — Z92.29 PERSONAL HISTORY OF OTHER DRUG THERAPY: ICD-10-CM

## 2021-09-01 PROCEDURE — 99395 PREV VISIT EST AGE 18-39: CPT

## 2021-09-01 NOTE — ASSESSMENT
[FreeTextEntry1] : Declined STD testing. Using condoms consistently with partner. \par UTD with pap smear

## 2021-09-01 NOTE — HEALTH RISK ASSESSMENT
[No] : No [1 or 2 (0 pts)] : 1 or 2 (0 points) [Never (0 pts)] : Never (0 points) [0] : 2) Feeling down, depressed, or hopeless: Not at all (0) [PHQ-2 Negative - No further assessment needed] : PHQ-2 Negative - No further assessment needed [Patient reported PAP Smear was normal] : Patient reported PAP Smear was normal [None] : None [With Family] : lives with family [Employed] : employed [Single] : single [Sexually Active] : sexually active [Feels Safe at Home] : Feels safe at home [Fully functional (bathing, dressing, toileting, transferring, walking, feeding)] : Fully functional (bathing, dressing, toileting, transferring, walking, feeding) [Fully functional (using the telephone, shopping, preparing meals, housekeeping, doing laundry, using] : Fully functional and needs no help or supervision to perform IADLs (using the telephone, shopping, preparing meals, housekeeping, doing laundry, using transportation, managing medications and managing finances) [] : No [de-identified] : rare fast food  [[id number]] : 0 [High Risk Behavior] : no high risk behavior [Reports changes in hearing] : Reports no changes in hearing [Reports changes in vision] : Reports no changes in vision [Reports normal functional visual acuity (ie: able to read med bottle)] : Reports poor functional visual acuity.  [FreeTextEntry2] : Teacher

## 2021-09-01 NOTE — PHYSICAL EXAM
[Normal] : soft, non-tender, non-distended, no masses palpated, no HSM and normal bowel sounds [Coordination Grossly Intact] : coordination grossly intact [No Focal Deficits] : no focal deficits [de-identified] : tearful when discussing COVID vaccination

## 2021-09-01 NOTE — HISTORY OF PRESENT ILLNESS
[FreeTextEntry1] : CPE, establish care with new PCP [de-identified] : 27 yo female with history of beta thalassemia (heterozygous), IBS presenting for CPE. Patient endorses family history of cardiomyopathy and states she had been evaluated by cardiology with no abnormal results. She has a history of IBS that is typically triggered by stress. It is alternating constipation/diarrhea type. Has flare ups infrequently at this point.  She was evaluated by GI per patient and had an endoscopy and colonoscopy with no abnormal results. \par \par Today, patient endorses significant anxiety over receiving the COVID vaccination. 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 She is concerned that it may impact her fertility. She is asking for options on how to be medically exempt. \par

## 2021-09-06 ENCOUNTER — TRANSCRIPTION ENCOUNTER (OUTPATIENT)
Age: 26
End: 2021-09-06

## 2021-09-08 ENCOUNTER — LABORATORY RESULT (OUTPATIENT)
Age: 26
End: 2021-09-08

## 2021-09-08 ENCOUNTER — NON-APPOINTMENT (OUTPATIENT)
Age: 26
End: 2021-09-08

## 2021-09-08 LAB
BASOPHILS # BLD AUTO: 0.09 K/UL
BASOPHILS NFR BLD AUTO: 0.9 %
EOSINOPHIL # BLD AUTO: 0.21 K/UL
EOSINOPHIL NFR BLD AUTO: 2.1 %
HCT VFR BLD CALC: 38.9 %
HGB BLD-MCNC: 12 G/DL
IMM GRANULOCYTES NFR BLD AUTO: 0.3 %
LYMPHOCYTES # BLD AUTO: 2.45 K/UL
LYMPHOCYTES NFR BLD AUTO: 25.1 %
MAN DIFF?: NORMAL
MCHC RBC-ENTMCNC: 19.1 PG
MCHC RBC-ENTMCNC: 30.8 GM/DL
MCV RBC AUTO: 61.8 FL
MONOCYTES # BLD AUTO: 0.65 K/UL
MONOCYTES NFR BLD AUTO: 6.7 %
NEUTROPHILS # BLD AUTO: 6.34 K/UL
NEUTROPHILS NFR BLD AUTO: 64.9 %
PLATELET # BLD AUTO: 310 K/UL
RBC # BLD: 6.29 M/UL
RBC # FLD: 15.3 %
WBC # FLD AUTO: 9.53 K/UL

## 2021-09-12 ENCOUNTER — TRANSCRIPTION ENCOUNTER (OUTPATIENT)
Age: 26
End: 2021-09-12

## 2021-09-19 ENCOUNTER — TRANSCRIPTION ENCOUNTER (OUTPATIENT)
Age: 26
End: 2021-09-19

## 2021-09-24 ENCOUNTER — TRANSCRIPTION ENCOUNTER (OUTPATIENT)
Age: 26
End: 2021-09-24

## 2021-09-27 ENCOUNTER — NON-APPOINTMENT (OUTPATIENT)
Age: 26
End: 2021-09-27

## 2021-10-06 ENCOUNTER — APPOINTMENT (OUTPATIENT)
Dept: FAMILY MEDICINE | Facility: CLINIC | Age: 26
End: 2021-10-06

## 2021-10-06 VITALS — TEMPERATURE: 96.8 F

## 2021-10-19 ENCOUNTER — APPOINTMENT (OUTPATIENT)
Dept: FAMILY MEDICINE | Facility: CLINIC | Age: 26
End: 2021-10-19
Payer: COMMERCIAL

## 2021-10-19 VITALS
DIASTOLIC BLOOD PRESSURE: 70 MMHG | HEART RATE: 77 BPM | RESPIRATION RATE: 14 BRPM | WEIGHT: 114 LBS | OXYGEN SATURATION: 100 % | TEMPERATURE: 96.9 F | SYSTOLIC BLOOD PRESSURE: 110 MMHG

## 2021-10-19 PROCEDURE — 99213 OFFICE O/P EST LOW 20 MIN: CPT

## 2021-10-19 RX ORDER — IBUPROFEN 400 MG/1
400 TABLET, FILM COATED ORAL ONCE
Qty: 1 | Refills: 0 | Status: DISCONTINUED | OUTPATIENT
Start: 2021-10-19 | End: 2021-10-19

## 2021-10-19 RX ORDER — IBUPROFEN 400 MG/1
400 TABLET, FILM COATED ORAL
Qty: 0 | Refills: 0 | Status: COMPLETED | OUTPATIENT
Start: 2021-10-19

## 2021-10-19 RX ADMIN — IBUPROFEN 1 MG: 400 TABLET, FILM COATED ORAL at 00:00

## 2021-10-19 NOTE — REVIEW OF SYSTEMS
[Fatigue] : fatigue [Headache] : headache [Dizziness] : dizziness [Anxiety] : anxiety [Negative] : Musculoskeletal

## 2021-10-20 ENCOUNTER — RESULT CHARGE (OUTPATIENT)
Age: 26
End: 2021-10-20

## 2021-10-20 NOTE — HISTORY OF PRESENT ILLNESS
[FreeTextEntry8] : cc: dizziness \par \par 25 yo female with history of beta thalessemia, IBS presents with one day of dizziness. Not described as room spinning or sensation of self spinning, more like a strong need to lay down. This all started with a headache yesterday evening. She took motrin at the time which did make her feel better. She currently has menses since 4 days ago.  She goes through about 2-3 pads/tampons per day. No new medications or other life changes. Denies any associated cough, sore throat, nasal congestion, ear pain. Denies nausea/vomiting, diarrhea, or constipation. She has a normal appetite, had a sandwich today which she tolerated well. \par \par Patient received COVID vaccine 9/30/2021 at [facility]. Received flu shot 2 weeks ago.

## 2021-10-20 NOTE — ASSESSMENT
[FreeTextEntry1] : Unclear etiology of above mentioned symptoms. Recent blood work without anemia. Vitals wnl. Encouraged rest, hydration, and regular meals. Ibuprofen given in clinic as patient reports she felt better after taking it yesterday. Reassurance provided. Patient to reach out tomorrow if symptoms worsen.

## 2021-10-30 ENCOUNTER — TRANSCRIPTION ENCOUNTER (OUTPATIENT)
Age: 26
End: 2021-10-30

## 2021-11-09 ENCOUNTER — TRANSCRIPTION ENCOUNTER (OUTPATIENT)
Age: 26
End: 2021-11-09

## 2021-12-16 ENCOUNTER — TRANSCRIPTION ENCOUNTER (OUTPATIENT)
Age: 26
End: 2021-12-16

## 2021-12-16 ENCOUNTER — APPOINTMENT (OUTPATIENT)
Dept: OBGYN | Facility: CLINIC | Age: 26
End: 2021-12-16
Payer: COMMERCIAL

## 2021-12-16 VITALS
BODY MASS INDEX: 19.29 KG/M2 | WEIGHT: 113 LBS | DIASTOLIC BLOOD PRESSURE: 80 MMHG | SYSTOLIC BLOOD PRESSURE: 120 MMHG | HEIGHT: 64 IN | HEART RATE: 86 BPM

## 2021-12-16 DIAGNOSIS — Z01.419 ENCOUNTER FOR GYNECOLOGICAL EXAMINATION (GENERAL) (ROUTINE) W/OUT ABNORMAL FINDINGS: ICD-10-CM

## 2021-12-16 PROCEDURE — 99395 PREV VISIT EST AGE 18-39: CPT

## 2021-12-16 NOTE — PHYSICAL EXAM
[Appropriately responsive] : appropriately responsive [Alert] : alert [No Acute Distress] : no acute distress [No Lymphadenopathy] : no lymphadenopathy [Soft] : soft [Non-tender] : non-tender [Non-distended] : non-distended [No HSM] : No HSM [No Lesions] : no lesions [No Mass] : no mass [Oriented x3] : oriented x3 [Examination Of The Breasts] : a normal appearance [No Masses] : no breast masses were palpable [Labia Majora] : normal [Labia Minora] : normal [Normal] : normal [Uterine Adnexae] : normal [Chaperone Present] : A chaperone was present in the examining room during all aspects of the physical examination

## 2021-12-21 LAB — CYTOLOGY CVX/VAG DOC THIN PREP: NORMAL

## 2022-01-16 NOTE — PATIENT PROFILE ADULT. - NS PRO AD NO ADVANCE DIRECTIVE
No [No Acute Distress] : no acute distress [Normal TMs] : both tympanic membranes were normal [No Respiratory Distress] : no respiratory distress  [Clear to Auscultation] : lungs were clear to auscultation bilaterally [Normal Rate] : normal rate  [Regular Rhythm] : with a regular rhythm [Normal S1, S2] : normal S1 and S2 [No Murmur] : no murmur heard [Alert and Oriented x3] : oriented to person, place, and time [de-identified] : Oropharynx erythematous, normal TM b/l

## 2022-01-28 ENCOUNTER — APPOINTMENT (OUTPATIENT)
Dept: ULTRASOUND IMAGING | Facility: CLINIC | Age: 27
End: 2022-01-28
Payer: COMMERCIAL

## 2022-01-28 ENCOUNTER — NON-APPOINTMENT (OUTPATIENT)
Age: 27
End: 2022-01-28

## 2022-01-28 ENCOUNTER — OUTPATIENT (OUTPATIENT)
Dept: OUTPATIENT SERVICES | Facility: HOSPITAL | Age: 27
LOS: 1 days | End: 2022-01-28
Payer: COMMERCIAL

## 2022-01-28 DIAGNOSIS — Z00.00 ENCOUNTER FOR GENERAL ADULT MEDICAL EXAMINATION WITHOUT ABNORMAL FINDINGS: ICD-10-CM

## 2022-01-28 PROCEDURE — 76700 US EXAM ABDOM COMPLETE: CPT | Mod: 26

## 2022-01-28 PROCEDURE — 76700 US EXAM ABDOM COMPLETE: CPT

## 2022-03-17 ENCOUNTER — EMERGENCY (EMERGENCY)
Facility: HOSPITAL | Age: 27
LOS: 1 days | Discharge: HOME | End: 2022-03-17
Attending: STUDENT IN AN ORGANIZED HEALTH CARE EDUCATION/TRAINING PROGRAM | Admitting: STUDENT IN AN ORGANIZED HEALTH CARE EDUCATION/TRAINING PROGRAM
Payer: COMMERCIAL

## 2022-03-17 ENCOUNTER — NON-APPOINTMENT (OUTPATIENT)
Age: 27
End: 2022-03-17

## 2022-03-17 VITALS
OXYGEN SATURATION: 99 % | SYSTOLIC BLOOD PRESSURE: 112 MMHG | HEIGHT: 64 IN | WEIGHT: 115.08 LBS | RESPIRATION RATE: 28 BRPM | TEMPERATURE: 100 F | HEART RATE: 117 BPM | DIASTOLIC BLOOD PRESSURE: 76 MMHG

## 2022-03-17 DIAGNOSIS — Z86.2 PERSONAL HISTORY OF DISEASES OF THE BLOOD AND BLOOD-FORMING ORGANS AND CERTAIN DISORDERS INVOLVING THE IMMUNE MECHANISM: ICD-10-CM

## 2022-03-17 DIAGNOSIS — R11.2 NAUSEA WITH VOMITING, UNSPECIFIED: ICD-10-CM

## 2022-03-17 DIAGNOSIS — K58.9 IRRITABLE BOWEL SYNDROME WITHOUT DIARRHEA: ICD-10-CM

## 2022-03-17 DIAGNOSIS — E87.6 HYPOKALEMIA: ICD-10-CM

## 2022-03-17 LAB
ALBUMIN SERPL ELPH-MCNC: 4 G/DL — SIGNIFICANT CHANGE UP (ref 3.3–5)
ALP SERPL-CCNC: 46 U/L — SIGNIFICANT CHANGE UP (ref 40–120)
ALT FLD-CCNC: 16 U/L — SIGNIFICANT CHANGE UP (ref 10–45)
ANION GAP SERPL CALC-SCNC: 11 MMOL/L — SIGNIFICANT CHANGE UP (ref 5–17)
ANISOCYTOSIS BLD QL: SLIGHT — SIGNIFICANT CHANGE UP
APPEARANCE UR: CLEAR — SIGNIFICANT CHANGE UP
AST SERPL-CCNC: 13 U/L — SIGNIFICANT CHANGE UP (ref 10–40)
BACTERIA # UR AUTO: NEGATIVE /HPF — SIGNIFICANT CHANGE UP
BASOPHILS # BLD AUTO: 0.03 K/UL — SIGNIFICANT CHANGE UP (ref 0–0.2)
BASOPHILS NFR BLD AUTO: 0.3 % — SIGNIFICANT CHANGE UP (ref 0–2)
BILIRUB SERPL-MCNC: 1 MG/DL — SIGNIFICANT CHANGE UP (ref 0.2–1.2)
BILIRUB UR-MCNC: NEGATIVE — SIGNIFICANT CHANGE UP
BUN SERPL-MCNC: 16 MG/DL — SIGNIFICANT CHANGE UP (ref 7–23)
CALCIUM SERPL-MCNC: 8.4 MG/DL — SIGNIFICANT CHANGE UP (ref 8.4–10.5)
CHLORIDE SERPL-SCNC: 103 MMOL/L — SIGNIFICANT CHANGE UP (ref 96–108)
CO2 SERPL-SCNC: 26 MMOL/L — SIGNIFICANT CHANGE UP (ref 22–31)
COLOR SPEC: YELLOW — SIGNIFICANT CHANGE UP
CREAT SERPL-MCNC: 0.84 MG/DL — SIGNIFICANT CHANGE UP (ref 0.5–1.3)
DIFF PNL FLD: ABNORMAL
EGFR: 99 ML/MIN/1.73M2 — SIGNIFICANT CHANGE UP
ELLIPTOCYTES BLD QL SMEAR: SLIGHT — SIGNIFICANT CHANGE UP
EOSINOPHIL # BLD AUTO: 0.04 K/UL — SIGNIFICANT CHANGE UP (ref 0–0.5)
EOSINOPHIL NFR BLD AUTO: 0.4 % — SIGNIFICANT CHANGE UP (ref 0–6)
EPI CELLS # UR: SIGNIFICANT CHANGE UP
GLUCOSE SERPL-MCNC: 111 MG/DL — HIGH (ref 70–99)
GLUCOSE UR QL: NEGATIVE — SIGNIFICANT CHANGE UP
HCG SERPL-ACNC: <1 MIU/ML — SIGNIFICANT CHANGE UP
HCT VFR BLD CALC: 36.2 % — SIGNIFICANT CHANGE UP (ref 34.5–45)
HGB BLD-MCNC: 11.5 G/DL — SIGNIFICANT CHANGE UP (ref 11.5–15.5)
IMM GRANULOCYTES NFR BLD AUTO: 0.3 % — SIGNIFICANT CHANGE UP (ref 0–1.5)
KETONES UR-MCNC: NEGATIVE — SIGNIFICANT CHANGE UP
LEUKOCYTE ESTERASE UR-ACNC: NEGATIVE — SIGNIFICANT CHANGE UP
LIDOCAIN IGE QN: 46 U/L — LOW (ref 73–393)
LYMPHOCYTES # BLD AUTO: 0.58 K/UL — LOW (ref 1–3.3)
LYMPHOCYTES # BLD AUTO: 6.3 % — LOW (ref 13–44)
MANUAL SMEAR VERIFICATION: SIGNIFICANT CHANGE UP
MCHC RBC-ENTMCNC: 19.4 PG — LOW (ref 27–34)
MCHC RBC-ENTMCNC: 31.8 GM/DL — LOW (ref 32–36)
MCV RBC AUTO: 61 FL — LOW (ref 80–100)
MICROCYTES BLD QL: SLIGHT — SIGNIFICANT CHANGE UP
MONOCYTES # BLD AUTO: 0.46 K/UL — SIGNIFICANT CHANGE UP (ref 0–0.9)
MONOCYTES NFR BLD AUTO: 5 % — SIGNIFICANT CHANGE UP (ref 2–14)
NEUTROPHILS # BLD AUTO: 8.08 K/UL — HIGH (ref 1.8–7.4)
NEUTROPHILS NFR BLD AUTO: 87.7 % — HIGH (ref 43–77)
NITRITE UR-MCNC: NEGATIVE — SIGNIFICANT CHANGE UP
NRBC # BLD: 0 /100 WBCS — SIGNIFICANT CHANGE UP (ref 0–0)
PH UR: 6.5 — SIGNIFICANT CHANGE UP (ref 5–8)
PLAT MORPH BLD: NORMAL — SIGNIFICANT CHANGE UP
PLATELET # BLD AUTO: 273 K/UL — SIGNIFICANT CHANGE UP (ref 150–400)
POLYCHROMASIA BLD QL SMEAR: SLIGHT — SIGNIFICANT CHANGE UP
POTASSIUM SERPL-MCNC: 3.4 MMOL/L — LOW (ref 3.5–5.3)
POTASSIUM SERPL-SCNC: 3.4 MMOL/L — LOW (ref 3.5–5.3)
PROT SERPL-MCNC: 6.9 G/DL — SIGNIFICANT CHANGE UP (ref 6–8.3)
PROT UR-MCNC: NEGATIVE — SIGNIFICANT CHANGE UP
RBC # BLD: 5.93 M/UL — HIGH (ref 3.8–5.2)
RBC # FLD: 14.6 % — HIGH (ref 10.3–14.5)
RBC BLD AUTO: ABNORMAL
RBC CASTS # UR COMP ASSIST: ABNORMAL /HPF (ref 0–4)
SCHISTOCYTES BLD QL AUTO: SLIGHT — SIGNIFICANT CHANGE UP
SODIUM SERPL-SCNC: 140 MMOL/L — SIGNIFICANT CHANGE UP (ref 135–145)
SP GR SPEC: 1 — LOW (ref 1.01–1.02)
UROBILINOGEN FLD QL: NEGATIVE — SIGNIFICANT CHANGE UP
WBC # BLD: 9.22 K/UL — SIGNIFICANT CHANGE UP (ref 3.8–10.5)
WBC # FLD AUTO: 9.22 K/UL — SIGNIFICANT CHANGE UP (ref 3.8–10.5)
WBC UR QL: NEGATIVE /HPF — SIGNIFICANT CHANGE UP (ref 0–5)

## 2022-03-17 PROCEDURE — 74177 CT ABD & PELVIS W/CONTRAST: CPT | Mod: 26,MA

## 2022-03-17 PROCEDURE — 99218: CPT

## 2022-03-17 PROCEDURE — 99285 EMERGENCY DEPT VISIT HI MDM: CPT

## 2022-03-17 RX ORDER — SODIUM CHLORIDE 9 MG/ML
1000 INJECTION INTRAMUSCULAR; INTRAVENOUS; SUBCUTANEOUS ONCE
Refills: 0 | Status: COMPLETED | OUTPATIENT
Start: 2022-03-17 | End: 2022-03-17

## 2022-03-17 RX ORDER — ONDANSETRON 8 MG/1
4 TABLET, FILM COATED ORAL ONCE
Refills: 0 | Status: COMPLETED | OUTPATIENT
Start: 2022-03-17 | End: 2022-03-17

## 2022-03-17 RX ORDER — KETOROLAC TROMETHAMINE 30 MG/ML
30 SYRINGE (ML) INJECTION ONCE
Refills: 0 | Status: DISCONTINUED | OUTPATIENT
Start: 2022-03-17 | End: 2022-03-17

## 2022-03-17 RX ORDER — FAMOTIDINE 10 MG/ML
20 INJECTION INTRAVENOUS ONCE
Refills: 0 | Status: COMPLETED | OUTPATIENT
Start: 2022-03-17 | End: 2022-03-17

## 2022-03-17 RX ADMIN — FAMOTIDINE 20 MILLIGRAM(S): 10 INJECTION INTRAVENOUS at 22:30

## 2022-03-17 RX ADMIN — SODIUM CHLORIDE 1000 MILLILITER(S): 9 INJECTION INTRAMUSCULAR; INTRAVENOUS; SUBCUTANEOUS at 21:56

## 2022-03-17 RX ADMIN — ONDANSETRON 4 MILLIGRAM(S): 8 TABLET, FILM COATED ORAL at 21:55

## 2022-03-17 RX ADMIN — FAMOTIDINE 200 MILLIGRAM(S): 10 INJECTION INTRAVENOUS at 21:55

## 2022-03-17 RX ADMIN — Medication 30 MILLIGRAM(S): at 22:29

## 2022-03-17 RX ADMIN — SODIUM CHLORIDE 1000 MILLILITER(S): 9 INJECTION INTRAMUSCULAR; INTRAVENOUS; SUBCUTANEOUS at 23:50

## 2022-03-17 NOTE — ED PROVIDER NOTE - NS ED ATTENDING STATEMENT MOD
This was a shared visit with the MACHO. I reviewed and verified the documentation and independently performed the documented:

## 2022-03-17 NOTE — ED ADULT NURSE NOTE - OBJECTIVE STATEMENT
25 yo female c/o abd pain since 11AM today vomited x 6.Denies any diarrhea. Subjective fever/chills (took tylenol at home). last bowel movement 9am today

## 2022-03-17 NOTE — ED PROVIDER NOTE - PROGRESS NOTE DETAILS
Nikita DO: S/o to Dr. ROSA pending work up at this time- labs, ct, urine all pending. pt with right ovarian cyst, no symptoms concerning for torsion will fu with gyn, all questions answered will andi/donell Hartmann DO pt unable to tolerate po, continues to vomit will admit to obs, dw with pt and Dr. Fuentes Hartmann DO

## 2022-03-17 NOTE — ED PROVIDER NOTE - PHYSICAL EXAMINATION
General:     NAD, well-nourished, well-appearing  Eyes: PERRL  Head:     NC/AT, EOMI, oral mucosa moist  Neck:     trachea midline  Lungs:     CTA b/l  CVS:     RRR  Abd:     epigastric tenderness to palpation. no cva tenderness  Ext:   no deformities   Neuro: AAOx3, no sensory/motor deficits

## 2022-03-17 NOTE — ED PROVIDER NOTE - CLINICAL SUMMARY MEDICAL DECISION MAKING FREE TEXT BOX
pt 25 yo f c/o epigastric pain for 1 day associated with nausea and vomiting. tried to take tylenol but vomited. fever 100.3. has a GI because in 2017 she had mono and had elevated liver enzymes which resolved and she also had an abnormal MRCP but no gallbladder removal  denies lower abd pain, vaginal discharge, dysuria, weakness, back pain  CT, labs, meds, reassess

## 2022-03-17 NOTE — ED PROVIDER NOTE - CARE PLAN
1 Principal Discharge DX:	Epigastric pain   Principal Discharge DX:	Epigastric pain  Secondary Diagnosis:	Nausea and vomiting

## 2022-03-17 NOTE — ED PROVIDER NOTE - NSFOLLOWUPINSTRUCTIONS_ED_ALL_ED_FT
Ovarian Cyst       An ovarian cyst is a fluid-filled sac on an ovary. Most of these cysts go away on their own and are not cancer. Some cysts need treatment.      What are the causes?    •Ovarian hyperstimulation syndrome. Some medicines may lead to this problem.      •Polycystic ovarian syndrome (PCOS). Problems with body chemicals (hormones) can lead to this condition.      •The normal menstrual cycle.        What increases the risk?    •Being overweight or very overweight.      •Taking medicines to increase your chance of getting pregnant.      •Using some types of birth control.      •Smoking.        What are the signs or symptoms?    Many ovarian cysts do not cause symptoms. If you get symptoms, you may have:  •Pain or pressure in the area between the hip bones.      •Pain in the lower belly.      •Pain during sex.      •Swelling in the lower belly.      •Periods that are not regular.      •Pain with periods.        How is this treated?    Many ovarian cysts go away on their own without treatment. If you need treatment, it may include:  •Medicines for pain.      •Fluid taken out of the cyst.      •The cyst being taken out.      •Birth control pills or other medicines.      •Surgery to remove the ovary.        Follow these instructions at home:    •Take over-the-counter and prescription medicines only as told by your doctor.      •Ask your doctor if you should avoid driving or using machines while you are taking your medicine.      •Get exams and Pap tests as told by your doctor.      •Return to your normal activities when your doctor says that it is safe.      • Do not smoke or use any products that contain nicotine or tobacco. If you need help quitting, ask your doctor.      •Keep all follow-up visits.        Contact a doctor if:  •Your periods:  •Are late.      •Are not regular.      •Stop.      •Are painful.        •You have pain in the area between your hip bones, and the pain does not go away.      •You feel pressure on your bladder.      •You have trouble peeing.      •You feel full, or your belly hurts, swells, or bloats.      •You gain or lose weight without trying, or you are less hungry than normal.      •You feel pain and pressure in your back.      •You feel pain and pressure in the area between your hip bones.      •You think you may be pregnant.        Get help right away if:    •You have pain in your belly that is very bad or gets worse.      •You have pain in the area between your hip bones, and the pain is very bad or gets worse.      •You cannot eat or drink without vomiting.      •You get a fever or chills all of a sudden.      •Your period is a lot heavier than usual.        Summary    •An ovarian cyst is a fluid-filled sac on an ovary.      •Some cysts may cause problems and need treatment.      •Most of these cysts go away on their own.      This information is not intended to replace advice given to you by your health care provider. Make sure you discuss any questions you have with your health care provider.      Document Revised: 05/27/2021 Document Reviewed: 05/27/2021    Elsevier Patient Education © 2022 [employer]vier Inc.

## 2022-03-17 NOTE — ED PROVIDER NOTE - OBJECTIVE STATEMENT
pt 27 yo f c/o epigastric pain for 1 day associated with nausea and vomiting. tried to take tylenol but vomited. fever 100.3. has a GI because in 2017 she had mono and had elevated liver enzymes which resolved and she also had an abnormal MRCP but no gallbladder removal  denies lower abd pain, vaginal discharge, dysuria, weakness, back pain

## 2022-03-17 NOTE — ED PROVIDER NOTE - PATIENT PORTAL LINK FT
You can access the FollowMyHealth Patient Portal offered by [facility] by registering at the following website: [email or web address]. By joining [employer]’s FollowMyHealth portal, you will also be able to view your health information using other applications (apps) compatible with our system.

## 2022-03-17 NOTE — ED PROVIDER NOTE - ATTENDING CONTRIBUTION TO CARE
I, Juani Shelton DO,  performed the initial face to face bedside interview with this patient regarding history of present illness, review of symptoms and relevant past medical, social and family history.  I completed an independent physical examination.  I was the initial provider who evaluated this patient.   I personally saw the patient and performed a substantive portion of the visit including all aspects of the medical decision making.  I have signed out the follow up of any pending tests (i.e. labs, radiological studies) to the ACP.  I have communicated the patient’s plan of care and disposition with the ACP.

## 2022-03-18 ENCOUNTER — TRANSCRIPTION ENCOUNTER (OUTPATIENT)
Age: 27
End: 2022-03-18

## 2022-03-18 ENCOUNTER — NON-APPOINTMENT (OUTPATIENT)
Age: 27
End: 2022-03-18

## 2022-03-18 VITALS
HEART RATE: 78 BPM | DIASTOLIC BLOOD PRESSURE: 64 MMHG | RESPIRATION RATE: 15 BRPM | OXYGEN SATURATION: 98 % | SYSTOLIC BLOOD PRESSURE: 102 MMHG | TEMPERATURE: 98 F

## 2022-03-18 DIAGNOSIS — N83.209 UNSPECIFIED OVARIAN CYST, UNSPECIFIED SIDE: ICD-10-CM

## 2022-03-18 LAB
ALBUMIN SERPL ELPH-MCNC: 3.5 G/DL — SIGNIFICANT CHANGE UP (ref 3.3–5)
ALP SERPL-CCNC: 39 U/L — LOW (ref 40–120)
ALT FLD-CCNC: 14 U/L — SIGNIFICANT CHANGE UP (ref 10–45)
ANION GAP SERPL CALC-SCNC: 6 MMOL/L — SIGNIFICANT CHANGE UP (ref 5–17)
AST SERPL-CCNC: 11 U/L — SIGNIFICANT CHANGE UP (ref 10–40)
BASOPHILS # BLD AUTO: 0.03 K/UL — SIGNIFICANT CHANGE UP (ref 0–0.2)
BASOPHILS NFR BLD AUTO: 0.5 % — SIGNIFICANT CHANGE UP (ref 0–2)
BILIRUB SERPL-MCNC: 0.7 MG/DL — SIGNIFICANT CHANGE UP (ref 0.2–1.2)
BUN SERPL-MCNC: 12 MG/DL — SIGNIFICANT CHANGE UP (ref 7–23)
CALCIUM SERPL-MCNC: 7.9 MG/DL — LOW (ref 8.4–10.5)
CHLORIDE SERPL-SCNC: 108 MMOL/L — SIGNIFICANT CHANGE UP (ref 96–108)
CO2 SERPL-SCNC: 27 MMOL/L — SIGNIFICANT CHANGE UP (ref 22–31)
CREAT SERPL-MCNC: 0.85 MG/DL — SIGNIFICANT CHANGE UP (ref 0.5–1.3)
EGFR: 99 ML/MIN/1.73M2 — SIGNIFICANT CHANGE UP
EOSINOPHIL # BLD AUTO: 0 K/UL — SIGNIFICANT CHANGE UP (ref 0–0.5)
EOSINOPHIL NFR BLD AUTO: 0 % — SIGNIFICANT CHANGE UP (ref 0–6)
GLUCOSE SERPL-MCNC: 105 MG/DL — HIGH (ref 70–99)
HCT VFR BLD CALC: 32.8 % — LOW (ref 34.5–45)
HGB BLD-MCNC: 10.3 G/DL — LOW (ref 11.5–15.5)
IMM GRANULOCYTES NFR BLD AUTO: 0.5 % — SIGNIFICANT CHANGE UP (ref 0–1.5)
LIDOCAIN IGE QN: 34 U/L — LOW (ref 73–393)
LYMPHOCYTES # BLD AUTO: 0.56 K/UL — LOW (ref 1–3.3)
LYMPHOCYTES # BLD AUTO: 9.1 % — LOW (ref 13–44)
MAGNESIUM SERPL-MCNC: 1.7 MG/DL — SIGNIFICANT CHANGE UP (ref 1.6–2.6)
MAGNESIUM SERPL-MCNC: 2.3 MG/DL — SIGNIFICANT CHANGE UP (ref 1.6–2.6)
MCHC RBC-ENTMCNC: 19 PG — LOW (ref 27–34)
MCHC RBC-ENTMCNC: 31.4 GM/DL — LOW (ref 32–36)
MCV RBC AUTO: 60.6 FL — LOW (ref 80–100)
MONOCYTES # BLD AUTO: 0.36 K/UL — SIGNIFICANT CHANGE UP (ref 0–0.9)
MONOCYTES NFR BLD AUTO: 5.8 % — SIGNIFICANT CHANGE UP (ref 2–14)
NEUTROPHILS # BLD AUTO: 5.19 K/UL — SIGNIFICANT CHANGE UP (ref 1.8–7.4)
NEUTROPHILS NFR BLD AUTO: 84.1 % — HIGH (ref 43–77)
NRBC # BLD: 0 /100 WBCS — SIGNIFICANT CHANGE UP (ref 0–0)
PLATELET # BLD AUTO: 361 K/UL — SIGNIFICANT CHANGE UP (ref 150–400)
POTASSIUM SERPL-MCNC: 3.4 MMOL/L — LOW (ref 3.5–5.3)
POTASSIUM SERPL-SCNC: 3.4 MMOL/L — LOW (ref 3.5–5.3)
PROT SERPL-MCNC: 6.1 G/DL — SIGNIFICANT CHANGE UP (ref 6–8.3)
RBC # BLD: 5.41 M/UL — HIGH (ref 3.8–5.2)
RBC # FLD: 14.7 % — HIGH (ref 10.3–14.5)
SARS-COV-2 RNA SPEC QL NAA+PROBE: SIGNIFICANT CHANGE UP
SODIUM SERPL-SCNC: 141 MMOL/L — SIGNIFICANT CHANGE UP (ref 135–145)
WBC # BLD: 6.17 K/UL — SIGNIFICANT CHANGE UP (ref 3.8–10.5)
WBC # FLD AUTO: 6.17 K/UL — SIGNIFICANT CHANGE UP (ref 3.8–10.5)

## 2022-03-18 PROCEDURE — 85025 COMPLETE CBC W/AUTO DIFF WBC: CPT

## 2022-03-18 PROCEDURE — 87635 SARS-COV-2 COVID-19 AMP PRB: CPT

## 2022-03-18 PROCEDURE — 81001 URINALYSIS AUTO W/SCOPE: CPT

## 2022-03-18 PROCEDURE — 80053 COMPREHEN METABOLIC PANEL: CPT

## 2022-03-18 PROCEDURE — 99285 EMERGENCY DEPT VISIT HI MDM: CPT | Mod: 25

## 2022-03-18 PROCEDURE — 96361 HYDRATE IV INFUSION ADD-ON: CPT

## 2022-03-18 PROCEDURE — 84702 CHORIONIC GONADOTROPIN TEST: CPT

## 2022-03-18 PROCEDURE — G0378: CPT

## 2022-03-18 PROCEDURE — 96376 TX/PRO/DX INJ SAME DRUG ADON: CPT

## 2022-03-18 PROCEDURE — 83690 ASSAY OF LIPASE: CPT

## 2022-03-18 PROCEDURE — 36415 COLL VENOUS BLD VENIPUNCTURE: CPT

## 2022-03-18 PROCEDURE — 83735 ASSAY OF MAGNESIUM: CPT

## 2022-03-18 PROCEDURE — 96365 THER/PROPH/DIAG IV INF INIT: CPT | Mod: XU

## 2022-03-18 PROCEDURE — 96375 TX/PRO/DX INJ NEW DRUG ADDON: CPT

## 2022-03-18 PROCEDURE — 74177 CT ABD & PELVIS W/CONTRAST: CPT | Mod: MA

## 2022-03-18 RX ORDER — FAMOTIDINE 10 MG/ML
20 INJECTION INTRAVENOUS
Refills: 0 | Status: DISCONTINUED | OUTPATIENT
Start: 2022-03-18 | End: 2022-03-18

## 2022-03-18 RX ORDER — DEXTROSE MONOHYDRATE, SODIUM CHLORIDE, AND POTASSIUM CHLORIDE 50; .745; 4.5 G/1000ML; G/1000ML; G/1000ML
1000 INJECTION, SOLUTION INTRAVENOUS
Refills: 0 | Status: DISCONTINUED | OUTPATIENT
Start: 2022-03-18 | End: 2022-03-21

## 2022-03-18 RX ORDER — ONDANSETRON 8 MG/1
1 TABLET, FILM COATED ORAL
Qty: 21 | Refills: 0
Start: 2022-03-18 | End: 2022-03-24

## 2022-03-18 RX ORDER — SODIUM CHLORIDE 9 MG/ML
1000 INJECTION, SOLUTION INTRAVENOUS
Refills: 0 | Status: DISCONTINUED | OUTPATIENT
Start: 2022-03-18 | End: 2022-03-18

## 2022-03-18 RX ORDER — ACETAMINOPHEN 500 MG
650 TABLET ORAL EVERY 6 HOURS
Refills: 0 | Status: DISCONTINUED | OUTPATIENT
Start: 2022-03-18 | End: 2022-03-21

## 2022-03-18 RX ORDER — SUCRALFATE 1 G
1 TABLET ORAL
Qty: 28 | Refills: 0
Start: 2022-03-18 | End: 2022-03-24

## 2022-03-18 RX ORDER — ONDANSETRON 8 MG/1
4 TABLET, FILM COATED ORAL EVERY 8 HOURS
Refills: 0 | Status: DISCONTINUED | OUTPATIENT
Start: 2022-03-18 | End: 2022-03-21

## 2022-03-18 RX ORDER — MAGNESIUM SULFATE 500 MG/ML
1 VIAL (ML) INJECTION ONCE
Refills: 0 | Status: COMPLETED | OUTPATIENT
Start: 2022-03-18 | End: 2022-03-18

## 2022-03-18 RX ORDER — SUCRALFATE 1 G
1 TABLET ORAL ONCE
Refills: 0 | Status: COMPLETED | OUTPATIENT
Start: 2022-03-18 | End: 2022-03-18

## 2022-03-18 RX ORDER — FAMOTIDINE 10 MG/ML
1 INJECTION INTRAVENOUS
Qty: 14 | Refills: 0
Start: 2022-03-18 | End: 2022-03-31

## 2022-03-18 RX ORDER — METOCLOPRAMIDE HCL 10 MG
10 TABLET ORAL ONCE
Refills: 0 | Status: COMPLETED | OUTPATIENT
Start: 2022-03-18 | End: 2022-03-18

## 2022-03-18 RX ORDER — FAMOTIDINE 10 MG/ML
20 INJECTION INTRAVENOUS DAILY
Refills: 0 | Status: DISCONTINUED | OUTPATIENT
Start: 2022-03-18 | End: 2022-03-21

## 2022-03-18 RX ORDER — PANTOPRAZOLE SODIUM 20 MG/1
40 TABLET, DELAYED RELEASE ORAL ONCE
Refills: 0 | Status: COMPLETED | OUTPATIENT
Start: 2022-03-18 | End: 2022-03-18

## 2022-03-18 RX ADMIN — Medication 100 GRAM(S): at 02:25

## 2022-03-18 RX ADMIN — SODIUM CHLORIDE 100 MILLILITER(S): 9 INJECTION, SOLUTION INTRAVENOUS at 01:33

## 2022-03-18 RX ADMIN — FAMOTIDINE 200 MILLIGRAM(S): 10 INJECTION INTRAVENOUS at 05:07

## 2022-03-18 RX ADMIN — DEXTROSE MONOHYDRATE, SODIUM CHLORIDE, AND POTASSIUM CHLORIDE 100 MILLILITER(S): 50; .745; 4.5 INJECTION, SOLUTION INTRAVENOUS at 03:16

## 2022-03-18 RX ADMIN — Medication 30 MILLIGRAM(S): at 00:15

## 2022-03-18 RX ADMIN — Medication 1 GRAM(S): at 01:14

## 2022-03-18 RX ADMIN — Medication 10 MILLIGRAM(S): at 01:33

## 2022-03-18 RX ADMIN — FAMOTIDINE 20 MILLIGRAM(S): 10 INJECTION INTRAVENOUS at 13:29

## 2022-03-18 RX ADMIN — PANTOPRAZOLE SODIUM 40 MILLIGRAM(S): 20 TABLET, DELAYED RELEASE ORAL at 01:33

## 2022-03-18 NOTE — DISCHARGE NOTE PROVIDER - CARE PROVIDER_API CALL
Kristy Nichole (DO)  Family Medicine  [address] [place] [address]  Phone: [phone]  Fax: [phone]  Follow Up Time:    Kristy Nichole (DO)  Family Medicine  [address] [place] [id number]  Phone: [phone]  Fax: [phone]  Follow Up Time:     Emil Dumont)  Gastroenterology; Internal Medicine  [address], [address]  [place], [place] [id number]  Phone: [phone]  Fax: [phone]  Follow Up Time:

## 2022-03-18 NOTE — H&P ADULT - NSHPPHYSICALEXAM_GEN_ALL_CORE
Vital Signs Last 24 Hrs  T(C): 37.5 (17 Mar 2022 20:54), Max: 37.5 (17 Mar 2022 20:54)  T(F): 99.5 (17 Mar 2022 20:54), Max: 99.5 (17 Mar 2022 20:54)  HR: 117 (17 Mar 2022 20:54) (117 - 117)  BP: 112/76 (17 Mar 2022 20:54) (112/76 - 112/76)  BP(mean): --  RR: 28 (17 Mar 2022 20:54) (28 - 28)  SpO2: 99% (17 Mar 2022 20:54) (99% - 99%)  Daily Height in cm: 162.56 (17 Mar 2022 20:54)    Daily   CAPILLARY BLOOD GLUCOSE        I&O's Summary      GENERAL: thin. in mod distress - complain of epigastric pain but after vomiting some bilious fluid with resolution of abd pain  HEAD:  Normocephalic  EYES: EOMI, PERRLA, conjunctiva and sclera clear  ENMT: No tonsillar erythema, exudates, or enlargement; Dry mucous membranes, No lesions  NECK: Supple, No JVD, no bruit, normal thyroid  NERVOUS SYSTEM:  Alert & Oriented X3, Good concentration; grossly  Motor Strength 5/5 B/L upper and lower extremities; DTRs 2+ intact and symmetric  CHEST/LUNG: Clear to auscultation bilaterally; No rales, rhonchi, wheezing, or rubs  HEART: Regular rate and rhythm; No murmurs, rubs, or gallops  ABDOMEN: Soft, Nontender, Nondistended no RUQ tenderness. no lower abd tenderness. no CVA tenderness. ; Bowel sounds present  EXTREMITIES:  2+ Peripheral Pulses, No clubbing, cyanosis, or edema  LYMPH: No lymphadenopathy noted  SKIN: No rashes or lesions

## 2022-03-18 NOTE — H&P ADULT - ASSESSMENT
26 F hx of B thal, hx of IBS pw intractable NV and epigastric pain.    #Intractable NV and epigastric pain. ?gastroenteritis  IVFs with K and Mag replacement.   anti-emetics.   IV Pepcid.   LFTs and lipase normal. No RUQ tenderness.  GI consult. Consider HIDA scan as her GI doctor had recommended it in the past.   clear liquid diet as tolerated.

## 2022-03-18 NOTE — H&P ADULT - NSICDXPASTMEDICALHX_GEN_ALL_CORE_FT
PAST MEDICAL HISTORY:  History of beta thalassemia     IBS (irritable bowel syndrome)     No pertinent past medical history

## 2022-03-18 NOTE — DISCHARGE NOTE PROVIDER - CARE PROVIDERS DIRECT ADDRESSES
,DirectAddress_Unknown ,DirectAddress_Unknown,[email or web address]

## 2022-03-18 NOTE — H&P ADULT - HISTORY OF PRESENT ILLNESS
Procedure(s):  ENDOSCOPIC RETROGRADE CHOLANGIOPANCREATOGRAPHY (ERCP). general    Anesthesia Post Evaluation      Multimodal analgesia: multimodal analgesia used between 6 hours prior to anesthesia start to PACU discharge  Patient location during evaluation: bedside  Patient participation: complete - patient participated  Level of consciousness: awake  Pain score: 0  Pain management: satisfactory to patient  Airway patency: patent  Anesthetic complications: no  Cardiovascular status: acceptable and blood pressure returned to baseline  Respiratory status: acceptable  Hydration status: acceptable  Comments: I have evaluated the patient and meets criteria for discharge from PACU. Payam Nevarez DO. Post anesthesia nausea and vomiting:  none  Final Post Anesthesia Temperature Assessment:  Normothermia (36.0-37.5 degrees C)      INITIAL Post-op Vital signs:   Vitals Value Taken Time   /70 01/01/22 1340   Temp 36.7 °C (98 °F) 01/01/22 1337   Pulse 77 01/01/22 1344   Resp 15 01/01/22 1344   SpO2 97 % 01/01/22 1344   Vitals shown include unvalidated device data.
26 F hx of B thal, hx of IBS pw intractable NV and epigastric pain. IBS sxs are usually alternating constipation/diarrhea. Pt reported 3 months ago, had an episode of epigastric pain associated with low grade fever that lasted for a day and resolved on its own. She had FU with Dr Wills (GI) who had ordered an abd US which was unchanged with known gallbladder polyps. A month ago with recurrent epigastric pain that lasted for an hr and FU with Dr Wills had recommended HIDA scan if persistent sxs. Today with recurrent epigastric pain mod to severe that started in the morning associated with low grade fever 100.3. Only ate a bagel earlier in the day. This evening began with intractable nausea and vomiting (which is not typical of her prev episodes) of mostly bilious fluid. No diarrhea, no dysuria, no flank pain. Epigastric pain is improved after vomiting. Denied SOB, cough, URI sxs. In ED, s/p Zofran, Reglan, ketorolac, Pepcid, Protonix with no relief. Was given a dose of carafate and unable to tolerate and vomited. Denied any recent weight loss. Vitals: Tmax: 99.5 P:117 BP: 112/76 sat 99%RA WBC: 9.22 K: 3.4 hcg neg, UA large blood CTAP: 2.1 cm adnexal cyst. no other acute pathology.   Mother Sri at bedside [phone]  Had colonscopy and endoscopy a year ago as part of IBS workup and reportedly negative.

## 2022-03-18 NOTE — ED ADULT NURSE REASSESSMENT NOTE - NS ED NURSE REASSESS COMMENT FT1
Patient resting comfortably in bed and in NAD. CT imaging completed. Patient reports that she no longer has nausea and pain has significantly improved, barely there. Minimal discomfort to epigastric region at this time. Abdomen is soft, minimal tenderness to epigastric region, no guarding noted. Awaiting results.

## 2022-03-18 NOTE — PATIENT PROFILE ADULT - FALL HARM RISK - UNIVERSAL INTERVENTIONS
Bed in lowest position, wheels locked, appropriate side rails in place/Call bell, personal items and telephone in reach/Instruct patient to call for assistance before getting out of bed or chair/Non-slip footwear when patient is out of bed/[place] to call system/Physically safe environment - no spills, clutter or unnecessary equipment/Purposeful Proactive Rounding/Room/bathroom lighting operational, light cord in reach

## 2022-03-18 NOTE — DISCHARGE NOTE NURSING/CASE MANAGEMENT/SOCIAL WORK - NSDCPEFALRISK_GEN_ALL_CORE
For information on Fall & Injury Prevention, visit: [email or web address] OR  [email or web address] OR  https://www.cdc.gov/steadi/patient.html

## 2022-03-18 NOTE — H&P ADULT - NSHPLABSRESULTS_GEN_ALL_CORE
11.5   9.22  )-----------( 273      ( 17 Mar 2022 22:00 )             36.2           140  |  103  |  16  ----------------------------<  111<H>  3.4<L>   |  26  |  0.84    Ca    8.4      17 Mar 2022 22:00  Mg     1.7         TPro  6.9  /  Alb  4.0  /  TBili  1.0  /  DBili  x   /  AST  13  /  ALT  16  /  AlkPhos  46         Magnesium, Serum: 1.7 mg/dL [1.6 - 2.6] (22 @ 01:36)    LIVER FUNCTIONS - ( 17 Mar 2022 22:00 )  Alb: 4.0 g/dL / Pro: 6.9 g/dL / ALK PHOS: 46 U/L / ALT: 16 U/L / AST: 13 U/L / GGT: x             Lipase, Serum: 46 U/L (22 @ 22:00)              Urinalysis Basic - ( 17 Mar 2022 23:00 )    Color: Yellow / Appearance: Clear / S.005 / pH: x  Gluc: x / Ketone: Negative  / Bili: Negative / Urobili: Negative   Blood: x / Protein: Negative / Nitrite: Negative   Leuk Esterase: Negative / RBC: 11-25 /HPF / WBC Negative /HPF   Sq Epi: x / Non Sq Epi: Neg.-Few / Bacteria: Negative /HPF        ra< from: CT Abdomen and Pelvis w/ Oral Cont and w/ IV Cont (22 @ 23:52) >    REPRODUCTIVE ORGANS: 2.1 cm right adnexal cyst. Uterus and adnexa are   otherwise grossly unremarkable.    BOWEL: No bowel obstruction. Appendix is unremarkable.  PERITONEUM: No ascites.  VESSELS: Normal caliber abdominal aorta.  RETROPERITONEUM/LYMPH NODES: No lymphadenopathy.  ABDOMINAL WALL: Within normal limits.  BONES: Within normal limits.    IMPRESSION:    No acute CT findings in the abdomen or pelvis. No evidenceof   appendicitis.      Abdominal SONO 2022    IMPRESSION:  Gallbladder polyps up to 5 mm stable from prior studies. Follow-up ultrasound in one year can be considered.    Otherwise normal right upper quadrant ultrasound.    --- End of Report ---    NATALIE ORTIZ MD; Attending Radiologist  This document has been electronically signed. 2022 6:54PM

## 2022-03-18 NOTE — DISCHARGE NOTE PROVIDER - NSDCCPCAREPLAN_GEN_ALL_CORE_FT
PRINCIPAL DISCHARGE DIAGNOSIS  Diagnosis: Intractable vomiting  Assessment and Plan of Treatment: You were admitted for intractable vomiting  You were treated with zofran  You were prescribed the following new medications: zofran and pepcid  You will need to follow up with your primary care physician. Follow up with GI doctor outpatient.  Prescription for zofran and pepcid sent to the pharmacy.       PRINCIPAL DISCHARGE DIAGNOSIS  Diagnosis: Intractable vomiting  Assessment and Plan of Treatment: You were admitted for intractable vomiting  You were treated with zofran  You were prescribed the following new medications: zofran and pepcid  You will need to follow up with your primary care physician. Follow up with GI doctor outpatient for gastric emptying study.  Prescription for dicyclomine, zofran and pepcid sent to the pharmacy.

## 2022-03-18 NOTE — PROGRESS NOTE ADULT - ASSESSMENT
PLAN:    -GI to see, possible HIDA scan  -PRN anti-emetics, zofran, can try phenergen  -K+ and mag2+ repleted  -continue IVFs given poor PO intake   -follow lytes  -send stool studies  -currently no RUQ tenderness on exam   PLAN:    -GI to see, possible HIDA scan  -PRN anti-emetics, zofran, can try phenergen, metoclopramide   -K+ and mag2+ repleted  -continue IVFs given poor PO intake   -follow lytes  -send stool studies  -currently no RUQ tenderness on exam

## 2022-03-18 NOTE — CONSULT NOTE ADULT - SUBJECTIVE AND OBJECTIVE BOX
Patient seen and examined.  Full consult dictated and will be available shortly.    Young female admitted with epigastric pain, nausea/vomiting that began yesterday - nowe resolved.  This is her third episode in last 2 months.  She is tolerating liquid diet without difficulty.  She is followed by Dr. Emil Dumont (GI) who performed an EGD & Colonoscopy last year, reportedly unremarkable.    VSS.  Abdomen soft, nontender to palpation    Impression: Upper abdominal pain, Nausea & Vomiting ?Gastroparesis    Plan:  1. Dicyclomine 20 mg every 6 hours as needed for pain  2. Gastric Emptying Study (as outpatient)  3. Advance diet as tolerated  4. May be discharged home.  Follow-up with Dr. Dumont soon.  She may follow-up with me if she prefers.

## 2022-03-18 NOTE — PROGRESS NOTE ADULT - ASSESSMENT
26 year old F PMH beta thalassemia, history of IBS presenting with intractable nausea/ vomiting and epigastric pain admitted for possible gastroenteritis.    #gastroenteritis  #intractable nausea and vomiting  #hypokalemia  - patient's symptoms now resolved  - IV pepcid changed to oral pepcid  - continue zofran PRN  - hypokalemia likely in setting of vomiting, continue with IVF with K   - continue with CLD, advance as tolerate  - GI consulted, follow up recs. Patient's outpatient GI recommended HIDA in the past however patient now asymptomatic and LFTs wnl    patient states she will update family on her own

## 2022-03-18 NOTE — DISCHARGE NOTE PROVIDER - NSDCMRMEDTOKEN_GEN_ALL_CORE_FT
ondansetron 4 mg oral tablet: 1 tab(s) orally every 8 hours   Pepcid 20 mg oral tablet: 1 tab(s) orally once a day   dicyclomine 20 mg oral tablet: 1 tab(s) orally every 6 hours, As Needed for pain  ondansetron 4 mg oral tablet: 1 tab(s) orally every 8 hours   Pepcid 20 mg oral tablet: 1 tab(s) orally once a day

## 2022-03-18 NOTE — DISCHARGE NOTE PROVIDER - HOSPITAL COURSE
Hospital Course  HPI:  26 F hx of B thal, hx of IBS pw intractable NV and epigastric pain. IBS sxs are usually alternating constipation/diarrhea. Pt reported 3 months ago, had an episode of epigastric pain associated with low grade fever that lasted for a day and resolved on its own. She had FU with Dr Wills (GI) who had ordered an abd US which was unchanged with known gallbladder polyps. A month ago with recurrent epigastric pain that lasted for an hr and FU with Dr Wills had recommended HIDA scan if persistent sxs. Today with recurrent epigastric pain mod to severe that started in the morning associated with low grade fever 100.3. Only ate a bagel earlier in the day. This evening began with intractable nausea and vomiting (which is not typical of her prev episodes) of mostly bilious fluid. No diarrhea, no dysuria, no flank pain. Epigastric pain is improved after vomiting. Denied SOB, cough, URI sxs. In ED, s/p Zofran, Reglan, ketorolac, Pepcid, Protonix with no relief. Was given a dose of carafate and unable to tolerate and vomited. Denied any recent weight loss. Vitals: Tmax: 99.5 P:117 BP: 112/76 sat 99%RA WBC: 9.22 K: 3.4 hcg neg, UA large blood CTAP: 2.1 cm adnexal cyst. no other acute pathology.   Mother Sri at bedside [phone]  Had colonscopy and endoscopy a year ago as part of IBS workup and reportedly negative.  (18 Mar 2022 02:10)    Patient admitted for intractable nausea and vomiting, was started on CLD and advanced as tolerate. Patient was seen by GI and recommended ______. Patient to follow up with GI outpatient.     You were admitted for intractable vomiting  You were diagnosed with intractable vomiting  You were treated with zofran  You were prescribed the following new medications: zofran and pepcid    You will need to follow up with your primary care physician.    Discharging Provider:  Geoffrey Bryson D.O.  Contact Info: Cell [phone] - Please call with any questions or concerns.    Outpatient Provider: Dr. Nichole, Dr. Wills Hospital Course  HPI:  26 F hx of B thal, hx of IBS pw intractable NV and epigastric pain. IBS sxs are usually alternating constipation/diarrhea. Pt reported 3 months ago, had an episode of epigastric pain associated with low grade fever that lasted for a day and resolved on its own. She had FU with Dr Wills (GI) who had ordered an abd US which was unchanged with known gallbladder polyps. A month ago with recurrent epigastric pain that lasted for an hr and FU with Dr Wills had recommended HIDA scan if persistent sxs. Today with recurrent epigastric pain mod to severe that started in the morning associated with low grade fever 100.3. Only ate a bagel earlier in the day. This evening began with intractable nausea and vomiting (which is not typical of her prev episodes) of mostly bilious fluid. No diarrhea, no dysuria, no flank pain. Epigastric pain is improved after vomiting. Denied SOB, cough, URI sxs. In ED, s/p Zofran, Reglan, ketorolac, Pepcid, Protonix with no relief. Was given a dose of carafate and unable to tolerate and vomited. Denied any recent weight loss. Vitals: Tmax: 99.5 P:117 BP: 112/76 sat 99%RA WBC: 9.22 K: 3.4 hcg neg, UA large blood CTAP: 2.1 cm adnexal cyst. no other acute pathology.   Mother Sri at bedside [phone]  Had colonscopy and endoscopy a year ago as part of IBS workup and reportedly negative.  (18 Mar 2022 02:10)    Patient admitted for intractable nausea and vomiting, was started on CLD and advanced as tolerate. Patient was seen by GI and recommended no acute intervention, gastric emptying study as outpatient. Patient to follow up with GI outpatient.     You were admitted for intractable vomiting  You were diagnosed with intractable vomiting  You were treated with zofran  You were prescribed the following new medications: zofran and pepcid and dicylomine 20mg    You will need to follow up with your primary care physician.    Discharging Provider:  Geoffrey Bryson D.O.  Contact Info: Cell [phone] - Please call with any questions or concerns.    Outpatient Provider: Dr. Nichole (informed), Dr. Wills

## 2022-03-18 NOTE — DISCHARGE NOTE NURSING/CASE MANAGEMENT/SOCIAL WORK - PATIENT PORTAL LINK FT
You can access the FollowMyHealth Patient Portal offered by [facility] by registering at the following website: [email or web address]. By joining [employer]’s FollowMyHealth portal, you will also be able to view your health information using other applications (apps) compatible with our system.

## 2022-03-18 NOTE — H&P ADULT - NSHPREVIEWOFSYSTEMS_GEN_ALL_CORE
CONSTITUTIONAL: + low grade fever, no weight loss, or fatigue  EYES: No eye pain, visual disturbances, or discharge  ENMT:  No difficulty hearing, tinnitus, vertigo; No sinus or throat pain  NECK: No pain or stiffness  RESPIRATORY: No cough, wheezing, chills or hemoptysis; No shortness of breath  CARDIOVASCULAR: No chest pain, palpitations, dizziness, or leg swelling  GASTROINTESTINAL: + epigastric pain. + nausea, vomiting, NO hematemesis; No diarrhea or constipation. No melena or hematochezia.  GENITOURINARY: No dysuria, frequency, hematuria, or incontinence  NEUROLOGICAL: No headaches, memory loss, loss of strength, numbness, or tremors  SKIN: No itching, burning, rashes, or lesions   LYMPH NODES: No enlarged glands  ENDOCRINE: No heat or cold intolerance; No hair loss  MUSCULOSKELETAL: No joint pain or swelling; No muscle, back, or extremity pain  PSYCHIATRIC: No depression, anxiety, mood swings, or difficulty sleeping  HEME/LYMPH: No easy bruising, or bleeding gums  ALLERY AND IMMUNOLOGIC: No hives or eczema    IMPROVE VTE Individual Risk Assessment          RISK                                                          Points  [  ] Previous VTE                                                3  [  ] Thrombophilia                                             2  [  ] Lower limb paralysis                                   2        (unable to hold up >15 seconds)    [  ] Current Cancer                                             2         (within 6 months)  [  ] Immobilization > 24 hrs                              1  [  ] ICU/CCU stay > 24 hours                             1  [  ] Age > 60                                                         1    IMPROVE VTE Score:         [     0    ]    Total Risk Factor Score:    0 - 1:   Consider IPC  >2 - 3:  Thromboprophylaxis required (enoxaparin or SQ heparin)        >4:   High Risk: Thromboprophylaxis required (enoxaparin or SQ heparin), optional add IPC  **If CONTRAINDICATION to enoxaparin or SQ heparin, USE IPCs**

## 2022-03-18 NOTE — DISCHARGE NOTE PROVIDER - PROVIDER TOKENS
PROVIDER:[TOKEN:[[id number]]] PROVIDER:[TOKEN:[[id number]]],PROVIDER:[TOKEN:[[id number]]]

## 2022-03-18 NOTE — PROGRESS NOTE ADULT - SUBJECTIVE AND OBJECTIVE BOX
Patient is a 26y old  Female who presents with a chief complaint of intractable vomiting and epigastric pain (18 Mar 2022 06:11)      Patient seen and examined at bedside. Admitted overnight for intractable vomiting. Patient at this time without vomiting since admission, feels better but still a little pain left in epigastric region. Patient is afraid to eat anything as it might cause pain but states she is feeling much better. Denies nausea or vomiting at this time, denies sob, chest pain, epigastric pain, passing flatus and had diarrhea overnight.    ALLERGIES:  No Known Allergies    MEDICATIONS  (STANDING):  famotidine    Tablet 20 milliGRAM(s) Oral daily  sodium chloride 0.9% with potassium chloride 20 mEq/L 1000 milliLiter(s) (100 mL/Hr) IV Continuous <Continuous>    MEDICATIONS  (PRN):  acetaminophen     Tablet .. 650 milliGRAM(s) Oral every 6 hours PRN Temp greater or equal to 38C (100.4F), Mild Pain (1 - 3)  ondansetron Injectable 4 milliGRAM(s) IV Push every 8 hours PRN Nausea and/or Vomiting    Vital Signs Last 24 Hrs  T(F): 98.8 (18 Mar 2022 08:00), Max: 99.5 (17 Mar 2022 20:54)  HR: 83 (18 Mar 2022 08:00) (81 - 117)  BP: 100/59 (18 Mar 2022 08:00) (95/55 - 112/76)  RR: 16 (18 Mar 2022 08:00) (16 - 28)  SpO2: 100% (18 Mar 2022 08:00) (99% - 100%)  I&O's Summary      PHYSICAL EXAM:  GENERAL: NAD, well-groomed, well-developed  HEAD:  Atraumatic, Normocephalic  EYES: PEERL, conjunctiva and sclera clear  ENMT: Moist mucous membranes, Good dentition, no thrush  NECK: Supple, No JVD  CHEST/LUNG: Clear to auscultation bilaterally, good air entry, non-labored breathing  HEART: RRR; S1/S2, No murmur  ABDOMEN: Soft, Nontender, Nondistended; Bowel sounds present, negative young sign, no tenderness to palpation of epigastric region  VASCULAR: Normal pulses, Normal capillary refill  EXTREMITIES: No calf tenderness, No cyanosis, No edema  SKIN: Warm, Intact  PSYCH: Normal mood, Normal affect  NERVOUS SYSTEM:  A/O x3, Good concentration    LABS:                        10.3   6.17  )-----------( 361      ( 18 Mar 2022 05:40 )             32.8         141  |  108  |  12  ----------------------------<  105  3.4   |  27  |  0.85    Ca    7.9      18 Mar 2022 05:40  Mg     2.3         TPro  6.1  /  Alb  3.5  /  TBili  0.7  /  DBili  x   /  AST  11  /  ALT  14  /  AlkPhos  39                                  Urinalysis Basic - ( 17 Mar 2022 23:00 )    Color: Yellow / Appearance: Clear / S.005 / pH: x  Gluc: x / Ketone: Negative  / Bili: Negative / Urobili: Negative   Blood: x / Protein: Negative / Nitrite: Negative   Leuk Esterase: Negative / RBC: 11-25 /HPF / WBC Negative /HPF   Sq Epi: x / Non Sq Epi: Neg.-Few / Bacteria: Negative /HPF        COVID-19 PCR: NotDetec (22 @ 01:45)      RADIOLOGY & ADDITIONAL TESTS: reviewed labs personally    Care Discussed with Consultants/Other Providers:

## 2022-03-23 PROBLEM — Z86.2 PERSONAL HISTORY OF DISEASES OF THE BLOOD AND BLOOD-FORMING ORGANS AND CERTAIN DISORDERS INVOLVING THE IMMUNE MECHANISM: Chronic | Status: ACTIVE | Noted: 2022-03-18

## 2022-03-23 PROBLEM — K58.9 IRRITABLE BOWEL SYNDROME WITHOUT DIARRHEA: Chronic | Status: ACTIVE | Noted: 2022-03-18

## 2022-03-28 ENCOUNTER — TRANSCRIPTION ENCOUNTER (OUTPATIENT)
Age: 27
End: 2022-03-28

## 2022-03-29 ENCOUNTER — NON-APPOINTMENT (OUTPATIENT)
Age: 27
End: 2022-03-29

## 2022-03-30 ENCOUNTER — APPOINTMENT (OUTPATIENT)
Dept: FAMILY MEDICINE | Facility: CLINIC | Age: 27
End: 2022-03-30
Payer: COMMERCIAL

## 2022-03-30 ENCOUNTER — OUTPATIENT (OUTPATIENT)
Dept: OUTPATIENT SERVICES | Facility: HOSPITAL | Age: 27
LOS: 1 days | End: 2022-03-30
Payer: COMMERCIAL

## 2022-03-30 ENCOUNTER — APPOINTMENT (OUTPATIENT)
Dept: ULTRASOUND IMAGING | Facility: HOSPITAL | Age: 27
End: 2022-03-30
Payer: COMMERCIAL

## 2022-03-30 VITALS
SYSTOLIC BLOOD PRESSURE: 104 MMHG | BODY MASS INDEX: 18.27 KG/M2 | RESPIRATION RATE: 14 BRPM | HEART RATE: 88 BPM | TEMPERATURE: 97.7 F | OXYGEN SATURATION: 99 % | HEIGHT: 64 IN | WEIGHT: 107 LBS | DIASTOLIC BLOOD PRESSURE: 73 MMHG

## 2022-03-30 DIAGNOSIS — N83.209 UNSPECIFIED OVARIAN CYST, UNSPECIFIED SIDE: ICD-10-CM

## 2022-03-30 PROCEDURE — 99213 OFFICE O/P EST LOW 20 MIN: CPT

## 2022-03-30 PROCEDURE — 76830 TRANSVAGINAL US NON-OB: CPT | Mod: 26

## 2022-03-30 PROCEDURE — 76830 TRANSVAGINAL US NON-OB: CPT

## 2022-03-30 NOTE — HISTORY OF PRESENT ILLNESS
[FreeTextEntry8] : cc: sore throat and tongue lesions\par \par 27 yo female with history of beta thalessemia, IBS presents with concern of sore throat and tongue lesions. Her sore throat started about 3 days ago and was initially associated with fever. The fever broke yesterday. She has a negative COVID test done at urgent care. As of this morning, she noticed some lesions at the front of her tongue. They are not sore or bothersome but they worry her. She has been taking motrin/advill intermittently for her other symptoms. Eating and drinking have been unaffected. \par \par \par

## 2022-03-30 NOTE — PHYSICAL EXAM
[Normal] : normal sclera/conjunctiva, pupils are equal, round and reactive to light and extraocular movements are intact [No Respiratory Distress] : no respiratory distress  [de-identified] : multiple shallow ulcerations with halo of anterior distal tongue. No discharge or bleeding noted.  No other macules or papule noted in the oropharynx. [de-identified] : shotty LAD primarily posterior cerivcal b/l

## 2022-04-19 ENCOUNTER — OUTPATIENT (OUTPATIENT)
Dept: OUTPATIENT SERVICES | Facility: HOSPITAL | Age: 27
LOS: 1 days | End: 2022-04-19
Payer: COMMERCIAL

## 2022-04-19 DIAGNOSIS — R10.9 UNSPECIFIED ABDOMINAL PAIN: ICD-10-CM

## 2022-04-19 LAB — GLUCOSE BLDC GLUCOMTR-MCNC: 86 MG/DL — SIGNIFICANT CHANGE UP (ref 70–99)

## 2022-04-19 PROCEDURE — 78264 GASTRIC EMPTYING IMG STUDY: CPT | Mod: MH

## 2022-04-19 PROCEDURE — A9548: CPT

## 2022-04-19 PROCEDURE — A9541: CPT

## 2022-04-19 PROCEDURE — 82962 GLUCOSE BLOOD TEST: CPT

## 2022-04-19 PROCEDURE — 78264 GASTRIC EMPTYING IMG STUDY: CPT | Mod: 26,MH

## 2022-05-13 DIAGNOSIS — Z12.83 ENCOUNTER FOR SCREENING FOR MALIGNANT NEOPLASM OF SKIN: ICD-10-CM

## 2022-07-11 NOTE — ED ADULT NURSE NOTE - CAS EDP DISCH TYPE
Discharge Planning Assessment  [facility]     Patient Name: Uri Mirza  MRN: [id number]  Today's Date: 7/11/2022    Admit Date: 7/8/2022     Discharge Needs Assessment     Row Name 07/11/22 0958       Living Environment    People in Home spouse    Name(s) of People in Home Yumiko Ruggiero  [phone]    Current Living Arrangements home    Primary Care Provided by self    Provides Primary Care For no one, unable/limited ability to care for self    Family Caregiver if Needed spouse    Family Caregiver Names Yumiko Ruggiero  [phone]    Quality of Family Relationships supportive    Able to Return to Prior Arrangements yes       Resource/Environmental Concerns    Resource/Environmental Concerns none    Transportation Concerns none       Transition Planning    Patient/Family Anticipates Transition to home with family    Patient/Family Anticipated Services at Transition none    Transportation Anticipated family or friend will provide       Discharge Needs Assessment    Readmission Within the Last 30 Days no previous admission in last 30 days    Equipment Currently Used at Home none    Concerns to be Addressed discharge planning    Provided Post Acute Provider List? N/A    N/A Provider List Comment No needs identified    Provided Post Acute Provider Quality & Resource List? N/A               Discharge Plan     Row Name 07/11/22 1007       Plan    Plan Home with family- watch for home oxygen needs.    Plan Comments Met with patient and Yumiko Ruggiero  [phone] at bedside. Patient granted me permission to speak with her while her  remained in the room. Prior to admission patient was working full time and was able to perform her own ADL’s. She denies using any special or adaptive equipment. Patient uses the [facility] pharmacy on [facility], denies any issues affording or remembering to take her medications. Patient does not have POA or living will on file. Discharge plans discussed,  plan is to return home with family support. Will be checking walking pulse oximetry prior to discharge and if home oxygen needed, patient is agreeable to using [facility].   will transport.  Will continue to monitor for new or changing discharge needs.  Hilda CASTANEDA RN CCP              Continued Care and Services - Admitted Since 7/8/2022     Durable Medical Equipment     Service Provider Request Status Selected Services Address Phone Fax Patient Preferred    [facility]  Pending - Request Sent N/A [id number], [place] [phone] --       Internal Comment last updated by Hilda Mast RN 7/11/2022 1006    Patient may need home oxygen.                            Expected Discharge Date and Time     Expected Discharge Date Expected Discharge Time    Jul 11, 2022          Demographic Summary     Row Name 07/11/22 0957       General Information    Admission Type inpatient    Arrived From emergency department    Referral Source admission list    Reason for Consult discharge planning    Preferred Language English       Contact Information    Permission Granted to Share Info With family/designee  Yumiko Ruggiero  [phone]               Functional Status     Row Name 07/11/22 0957       Functional Status    Usual Activity Tolerance excellent    Current Activity Tolerance good       Functional Status, IADL    Medications independent    Meal Preparation independent    Housekeeping independent    Laundry independent    Shopping independent       Mental Status    General Appearance WDL WDL       Mental Status Summary    Recent Changes in Mental Status/Cognitive Functioning no changes       Employment/    Employment Status employed full-time    Current or Previous Occupation factory work               Psychosocial    No documentation.                Abuse/Neglect    No documentation.                Legal    No documentation.                Substance Abuse     No documentation.                Patient Forms    No documentation.                   Hilda Mast RN     Home

## 2022-07-20 ENCOUNTER — NON-APPOINTMENT (OUTPATIENT)
Age: 27
End: 2022-07-20

## 2022-07-22 NOTE — PHYSICAL EXAM
[No Acute Distress] : no acute distress lacerations [Well Nourished] : well nourished [Well Developed] : well developed [Well-Appearing] : well-appearing [Normal Sclera/Conjunctiva] : normal sclera/conjunctiva [PERRL] : pupils equal round and reactive to light [EOMI] : extraocular movements intact [Normal Outer Ear/Nose] : the outer ears and nose were normal in appearance [Normal Oropharynx] : the oropharynx was normal [No JVD] : no jugular venous distention [Supple] : supple [No Lymphadenopathy] : no lymphadenopathy [Thyroid Normal, No Nodules] : the thyroid was normal and there were no nodules present [No Respiratory Distress] : no respiratory distress  [Clear to Auscultation] : lungs were clear to auscultation bilaterally [No Accessory Muscle Use] : no accessory muscle use [Normal Rate] : normal rate  [Regular Rhythm] : with a regular rhythm [Normal S1, S2] : normal S1 and S2 [No Murmur] : no murmur heard [No Carotid Bruits] : no carotid bruits [No Abdominal Bruit] : a ~M bruit was not heard ~T in the abdomen [No Varicosities] : no varicosities [Pedal Pulses Present] : the pedal pulses are present [No Edema] : there was no peripheral edema [No Extremity Clubbing/Cyanosis] : no extremity clubbing/cyanosis [No Palpable Aorta] : no palpable aorta [Soft] : abdomen soft [Non Tender] : non-tender [Non-distended] : non-distended [No Masses] : no abdominal mass palpated [No HSM] : no HSM [Normal Bowel Sounds] : normal bowel sounds [Normal Posterior Cervical Nodes] : no posterior cervical lymphadenopathy [Normal Anterior Cervical Nodes] : no anterior cervical lymphadenopathy [No CVA Tenderness] : no CVA  tenderness [No Spinal Tenderness] : no spinal tenderness [No Joint Swelling] : no joint swelling [Grossly Normal Strength/Tone] : grossly normal strength/tone [No Rash] : no rash [Normal Gait] : normal gait [Coordination Grossly Intact] : coordination grossly intact [No Focal Deficits] : no focal deficits [Deep Tendon Reflexes (DTR)] : deep tendon reflexes were 2+ and symmetric [Normal Affect] : the affect was normal [Normal Insight/Judgement] : insight and judgment were intact

## 2022-08-12 NOTE — ED PROVIDER NOTE - PSH
Patient has a history of vitamin D deficiency.  He is taking over-the-counter supplementation.  Plan: Continue current.   No significant past surgical history

## 2022-09-15 ENCOUNTER — APPOINTMENT (OUTPATIENT)
Dept: FAMILY MEDICINE | Facility: CLINIC | Age: 27
End: 2022-09-15

## 2022-09-19 NOTE — PROGRESS NOTE ADULT - SUBJECTIVE AND OBJECTIVE BOX
Patient notified of Dr Gutiérrez's recommendations. Advised patient to contact the office if not improving or gets worse. Patient verbalized understanding.   F/U Note:    26y Female admitted with intractable nausea/vomitting    Interval Hx:  -CT abd negative  -has hx of gall bladder stones/polyps  -pregnancy test (-)  -has history of IBD as well, but usually that is constipation/diarrhea    Vital Signs Last 24 Hrs  T(C): 36.6 (18 Mar 2022 04:41), Max: 37.5 (17 Mar 2022 20:54)  T(F): 97.8 (18 Mar 2022 04:41), Max: 99.5 (17 Mar 2022 20:54)  HR: 81 (18 Mar 2022 04:41) (81 - 117)  BP: 100/57 (18 Mar 2022 04:41) (95/55 - 112/76)  BP(mean): 79 (18 Mar 2022 03:00) (79 - 79)  RR: 18 (18 Mar 2022 04:41) (18 - 28)  SpO2: 100% (18 Mar 2022 04:41) (99% - 100%)                            11.5   9.22  )-----------( 273      ( 17 Mar 2022 22:00 )             36.2         03-17    140  |  103  |  16  ----------------------------<  111<H>  3.4<L>   |  26  |  0.84    Ca    8.4      17 Mar 2022 22:00  Mg     1.7     03-18    TPro  6.9  /  Alb  4.0  /  TBili  1.0  /  DBili  x   /  AST  13  /  ALT  16  /  AlkPhos  46  03-17        NEURO: no headaches, blurry vision, tremors, depression, anxity  CV: no chest pain, palpitations, murmurs, orthopnea  Resp: no shortness of breath, cough, wheeze, sputum production  GI: no stomach pain, (+) nausea, and vomitting,no  flatulence, hematemesis, hematochezia  PV: no swelling of extremities, no hair loss, no coolness to extremities  ENDO: no polydypsia, polyphagia, polyuria, weight loss, night sweats          NEURO: awake and alert  CV: (+) S1/S2, rrr, no mrg  RESP: CTA b/l  GI: soft, non tender            F/U Note:    26y Female admitted with intractable nausea/vomitting    Interval Hx:  -CT abd negative  -has hx of gall bladder stones/polyps  -pregnancy test (-)  -lipase negative  -no Hx of diabetes, no anion gap  -has history of IBD as well, but usually that is constipation/diarrhea    Vital Signs Last 24 Hrs  T(C): 36.6 (18 Mar 2022 04:41), Max: 37.5 (17 Mar 2022 20:54)  T(F): 97.8 (18 Mar 2022 04:41), Max: 99.5 (17 Mar 2022 20:54)  HR: 81 (18 Mar 2022 04:41) (81 - 117)  BP: 100/57 (18 Mar 2022 04:41) (95/55 - 112/76)  BP(mean): 79 (18 Mar 2022 03:00) (79 - 79)  RR: 18 (18 Mar 2022 04:41) (18 - 28)  SpO2: 100% (18 Mar 2022 04:41) (99% - 100%)                            11.5   9.22  )-----------( 273      ( 17 Mar 2022 22:00 )             36.2         03-17    140  |  103  |  16  ----------------------------<  111<H>  3.4<L>   |  26  |  0.84    Ca    8.4      17 Mar 2022 22:00  Mg     1.7     03-18    TPro  6.9  /  Alb  4.0  /  TBili  1.0  /  DBili  x   /  AST  13  /  ALT  16  /  AlkPhos  46  03-17        NEURO: no headaches, blurry vision, tremors, depression, anxity  CV: no chest pain, palpitations, murmurs, orthopnea  Resp: no shortness of breath, cough, wheeze, sputum production  GI: no stomach pain, (+) nausea, and vomitting,no  flatulence, hematemesis, hematochezia  PV: no swelling of extremities, no hair loss, no coolness to extremities  ENDO: no polydypsia, polyphagia, polyuria, weight loss, night sweats          NEURO: awake and alert  CV: (+) S1/S2, rrr, no mrg  RESP: CTA b/l  GI: soft, non tender

## 2022-09-26 ENCOUNTER — APPOINTMENT (OUTPATIENT)
Dept: ULTRASOUND IMAGING | Facility: HOSPITAL | Age: 27
End: 2022-09-26

## 2022-09-26 ENCOUNTER — OUTPATIENT (OUTPATIENT)
Dept: OUTPATIENT SERVICES | Facility: HOSPITAL | Age: 27
LOS: 1 days | End: 2022-09-26
Payer: COMMERCIAL

## 2022-09-26 DIAGNOSIS — Z71.9 COUNSELING, UNSPECIFIED: ICD-10-CM

## 2022-09-26 PROCEDURE — 76700 US EXAM ABDOM COMPLETE: CPT | Mod: 26

## 2022-09-26 PROCEDURE — 76700 US EXAM ABDOM COMPLETE: CPT

## 2022-10-13 ENCOUNTER — APPOINTMENT (OUTPATIENT)
Dept: FAMILY MEDICINE | Facility: CLINIC | Age: 27
End: 2022-10-13

## 2022-10-13 VITALS
WEIGHT: 107 LBS | OXYGEN SATURATION: 100 % | HEART RATE: 70 BPM | RESPIRATION RATE: 14 BRPM | SYSTOLIC BLOOD PRESSURE: 118 MMHG | TEMPERATURE: 98 F | DIASTOLIC BLOOD PRESSURE: 75 MMHG

## 2022-10-13 PROCEDURE — 99395 PREV VISIT EST AGE 18-39: CPT

## 2022-10-13 RX ORDER — AMOXICILLIN 500 MG/1
500 CAPSULE ORAL TWICE DAILY
Qty: 20 | Refills: 0 | Status: DISCONTINUED | COMMUNITY
Start: 2022-03-31 | End: 2022-10-13

## 2022-10-13 RX ORDER — SIMETHICONE 125 MG/1
125 TABLET, CHEWABLE ORAL
Qty: 90 | Refills: 0 | Status: DISCONTINUED | COMMUNITY
Start: 2022-01-28 | End: 2022-10-13

## 2022-10-13 RX ORDER — CHLORHEXIDINE GLUCONATE, 0.12% ORAL RINSE 1.2 MG/ML
0.12 SOLUTION DENTAL
Qty: 1 | Refills: 1 | Status: DISCONTINUED | COMMUNITY
Start: 2022-03-30 | End: 2022-10-13

## 2022-10-14 NOTE — REVIEW OF SYSTEMS
[Skin Rash] : skin rash [Fever] : no fever [Discharge] : no discharge [Pain] : no pain [Earache] : no earache [Sore Throat] : no sore throat [Chest Pain] : no chest pain [Shortness Of Breath] : no shortness of breath [Cough] : no cough [Abdominal Pain] : no abdominal pain [Nausea] : no nausea [Constipation] : no constipation [Diarrhea] : diarrhea [Vomiting] : no vomiting [Dysuria] : no dysuria [Hematuria] : no hematuria [Muscle Pain] : no muscle pain [Back Pain] : no back pain [Itching] : no itching [Headache] : no headache [Dizziness] : no dizziness [Anxiety] : no anxiety [Depression] : no depression [de-identified] : b/l elbow eczema, sees dermatologist

## 2022-10-14 NOTE — HISTORY OF PRESENT ILLNESS
[FreeTextEntry1] : CPE [de-identified] : 28 yo female with history of beta thalassemia (heterozygous), IBS, presenting for CPE. Patient reports IBS occasionally flares up, typically exacerbated by stress. 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 She was instructed to follow-up with a genetics counselor. Also notes her 27 yo fiance recently had a heart attack suspected to be COVID related. Despite this, she reports her mood is stable. \par Declines flu vaccine today. \par . Currently lives with parents. \par

## 2022-10-14 NOTE — HEALTH RISK ASSESSMENT
[Fully functional (bathing, dressing, toileting, transferring, walking, feeding)] : Fully functional (bathing, dressing, toileting, transferring, walking, feeding) [Fully functional (using the telephone, shopping, preparing meals, housekeeping, doing laundry, using] : Fully functional and needs no help or supervision to perform IADLs (using the telephone, shopping, preparing meals, housekeeping, doing laundry, using transportation, managing medications and managing finances) [Good] : ~his/her~  mood as  good [Never] : Never [Yes] : Yes [Monthly or less (1 pt)] : Monthly or less (1 point) [1 or 2 (0 pts)] : 1 or 2 (0 points) [Never (0 pts)] : Never (0 points) [No falls in past year] : Patient reported no falls in the past year [0] : 2) Feeling down, depressed, or hopeless: Not at all (0) [PHQ-2 Negative - No further assessment needed] : PHQ-2 Negative - No further assessment needed [Patient reported PAP Smear was normal] : Patient reported PAP Smear was normal [None] : None [With Family] : lives with family [Employed] : employed [Sexually Active] : sexually active [de-identified] : Dr. Wills  [[id number]] : 0 [High Risk Behavior] : no high risk behavior [Reports changes in hearing] : Reports no changes in hearing [Reports changes in vision] : Reports no changes in vision [Reports changes in dental health] : Reports no changes in dental health [PapSmearDate] : 12/2021 [FreeTextEntry2] :  in [place]

## 2022-10-25 LAB
ALBUMIN SERPL ELPH-MCNC: 4.5 G/DL
ALP BLD-CCNC: 47 U/L
ALT SERPL-CCNC: 8 U/L
ANION GAP SERPL CALC-SCNC: 12 MMOL/L
AST SERPL-CCNC: 12 U/L
BASOPHILS # BLD AUTO: 0.09 K/UL
BASOPHILS NFR BLD AUTO: 1.4 %
BILIRUB SERPL-MCNC: 0.5 MG/DL
BUN SERPL-MCNC: 10 MG/DL
CALCIUM SERPL-MCNC: 9.9 MG/DL
CHLORIDE SERPL-SCNC: 105 MMOL/L
CO2 SERPL-SCNC: 23 MMOL/L
CREAT SERPL-MCNC: 0.79 MG/DL
EGFR: 106 ML/MIN/1.73M2
EOSINOPHIL # BLD AUTO: 0.13 K/UL
EOSINOPHIL NFR BLD AUTO: 2 %
GLUCOSE SERPL-MCNC: 90 MG/DL
HCT VFR BLD CALC: 34.6 %
HGB BLD-MCNC: 11 G/DL
IMM GRANULOCYTES NFR BLD AUTO: 0.2 %
LYMPHOCYTES # BLD AUTO: 2.28 K/UL
LYMPHOCYTES NFR BLD AUTO: 35.9 %
MAN DIFF?: NORMAL
MCHC RBC-ENTMCNC: 19.4 PG
MCHC RBC-ENTMCNC: 31.8 GM/DL
MCV RBC AUTO: 61.1 FL
MONOCYTES # BLD AUTO: 0.62 K/UL
MONOCYTES NFR BLD AUTO: 9.8 %
NEUTROPHILS # BLD AUTO: 3.22 K/UL
NEUTROPHILS NFR BLD AUTO: 50.7 %
PLATELET # BLD AUTO: 407 K/UL
POTASSIUM SERPL-SCNC: 4 MMOL/L
PROT SERPL-MCNC: 6.8 G/DL
RBC # BLD: 5.66 M/UL
RBC # FLD: 14.6 %
SODIUM SERPL-SCNC: 140 MMOL/L
WBC # FLD AUTO: 6.35 K/UL

## 2022-12-01 ENCOUNTER — NON-APPOINTMENT (OUTPATIENT)
Age: 27
End: 2022-12-01

## 2022-12-02 NOTE — REVIEW OF SYSTEMS
Burow's Advancement Flap Text: The defect edges were debeveled with a #15 scalpel blade.  Given the location of the defect and the proximity to free margins a Burow's advancement flap was deemed most appropriate.  Using a sterile surgical marker, the appropriate advancement flap was drawn incorporating the defect and placing the expected incisions within the relaxed skin tension lines where possible.    The area thus outlined was incised deep to adipose tissue with a #15 scalpel blade.  The skin margins were undermined to an appropriate distance in all directions utilizing iris scissors. [Sore Throat] : sore throat [Cough] : cough [Vomiting] : vomiting

## 2023-03-09 NOTE — COUNSELING
Preferred method of results communication: [facility] Maintenance Due   Topic Date Due   • Hepatitis B Vaccine (1 of 3 - 3-dose series) Never done   • Pneumococcal Vaccine 0-64 (1 - PCV) Never done   • Hepatitis C Screening  Never done   • DTaP/Tdap/Td Vaccine (2 - Td or Tdap) 06/02/2013   • Influenza Vaccine (1) Never done              Patient is due for topics as listed above but is not proceeding with above at this time.           Recent PHQ 2/9 Score       PHQ 2:     Date Adult PHQ 2 Score Adult PHQ 2 Interpretation   3/9/2023 0 No further screening needed          PHQ 9:                A    [Nutrition] : nutrition [Breast Self Exam] : breast self exam [Exercise] : exercise [Vitamins/Supplements] : vitamins/supplements [STD (testing, results, tx)] : STD (testing, results, tx) [Safe Sexual Practices] : safe sexual practices [Contraception] : contraception [Emergency Contraception] : emergency contraception

## 2023-03-23 ENCOUNTER — NON-APPOINTMENT (OUTPATIENT)
Age: 28
End: 2023-03-23

## 2023-10-26 ENCOUNTER — APPOINTMENT (OUTPATIENT)
Dept: FAMILY MEDICINE | Facility: CLINIC | Age: 28
End: 2023-10-26
Payer: COMMERCIAL

## 2023-10-26 VITALS
SYSTOLIC BLOOD PRESSURE: 114 MMHG | WEIGHT: 111 LBS | OXYGEN SATURATION: 100 % | RESPIRATION RATE: 14 BRPM | TEMPERATURE: 97.8 F | DIASTOLIC BLOOD PRESSURE: 71 MMHG | HEART RATE: 72 BPM

## 2023-10-26 DIAGNOSIS — Z87.898 PERSONAL HISTORY OF OTHER SPECIFIED CONDITIONS: ICD-10-CM

## 2023-10-26 DIAGNOSIS — K12.0 RECURRENT ORAL APHTHAE: ICD-10-CM

## 2023-10-26 DIAGNOSIS — K58.2 MIXED IRRITABLE BOWEL SYNDROME: ICD-10-CM

## 2023-10-26 DIAGNOSIS — M54.50 LOW BACK PAIN, UNSPECIFIED: ICD-10-CM

## 2023-10-26 DIAGNOSIS — Z86.2 PERSONAL HISTORY OF DISEASES OF THE BLOOD AND BLOOD-FORMING ORGANS AND CERTAIN DISORDERS INVOLVING THE IMMUNE MECHANISM: ICD-10-CM

## 2023-10-26 DIAGNOSIS — B95.0 STREPTOCOCCUS, GROUP A, AS THE CAUSE OF DISEASES CLASSIFIED ELSEWHERE: ICD-10-CM

## 2023-10-26 DIAGNOSIS — G89.29 LOW BACK PAIN, UNSPECIFIED: ICD-10-CM

## 2023-10-26 DIAGNOSIS — D56.3 THALASSEMIA MINOR: ICD-10-CM

## 2023-10-26 DIAGNOSIS — B33.8 OTHER SPECIFIED VIRAL DISEASES: ICD-10-CM

## 2023-10-26 PROCEDURE — 99395 PREV VISIT EST AGE 18-39: CPT

## 2023-10-26 RX ORDER — COLCHICINE 0.6 MG/1
0.6 TABLET ORAL DAILY
Refills: 0 | Status: ACTIVE | COMMUNITY
Start: 2023-10-26

## 2023-10-28 ENCOUNTER — LABORATORY RESULT (OUTPATIENT)
Age: 28
End: 2023-10-28

## 2023-11-01 ENCOUNTER — TRANSCRIPTION ENCOUNTER (OUTPATIENT)
Age: 28
End: 2023-11-01

## 2023-11-01 LAB
ALBUMIN SERPL ELPH-MCNC: 4.9 G/DL
ALP BLD-CCNC: 39 U/L
ALT SERPL-CCNC: 8 U/L
ANION GAP SERPL CALC-SCNC: 9 MMOL/L
AST SERPL-CCNC: 12 U/L
BASOPHILS # BLD AUTO: 0.12 K/UL
BASOPHILS NFR BLD AUTO: 1.8 %
BILIRUB SERPL-MCNC: 0.6 MG/DL
BUN SERPL-MCNC: 12 MG/DL
CALCIUM SERPL-MCNC: 9.6 MG/DL
CHLORIDE SERPL-SCNC: 106 MMOL/L
CHOLEST SERPL-MCNC: 130 MG/DL
CO2 SERPL-SCNC: 26 MMOL/L
CREAT SERPL-MCNC: 0.8 MG/DL
EGFR: 104 ML/MIN/1.73M2
EOSINOPHIL # BLD AUTO: 0.18 K/UL
EOSINOPHIL NFR BLD AUTO: 2.6 %
ESTIMATED AVERAGE GLUCOSE: 94 MG/DL
FERRITIN SERPL-MCNC: 24 NG/ML
GLUCOSE SERPL-MCNC: 85 MG/DL
HBA1C MFR BLD HPLC: 4.9 %
HCG SERPL-MCNC: <1 MIU/ML
HCT VFR BLD CALC: 35.3 %
HDLC SERPL-MCNC: 56 MG/DL
HGB BLD-MCNC: 10.9 G/DL
IRON SATN MFR SERPL: 39 %
IRON SERPL-MCNC: 106 UG/DL
LDLC SERPL CALC-MCNC: 62 MG/DL
LYMPHOCYTES # BLD AUTO: 2.31 K/UL
LYMPHOCYTES NFR BLD AUTO: 33.9 %
MAN DIFF?: NORMAL
MCHC RBC-ENTMCNC: 19.1 PG
MCHC RBC-ENTMCNC: 30.9 GM/DL
MCV RBC AUTO: 61.8 FL
MONOCYTES # BLD AUTO: 0.35 K/UL
MONOCYTES NFR BLD AUTO: 5.2 %
NEUTROPHILS # BLD AUTO: 3.85 K/UL
NEUTROPHILS NFR BLD AUTO: 56.5 %
NONHDLC SERPL-MCNC: 74 MG/DL
PLATELET # BLD AUTO: 323 K/UL
POTASSIUM SERPL-SCNC: 4.6 MMOL/L
PROT SERPL-MCNC: 7 G/DL
RBC # BLD: 5.71 M/UL
RBC # FLD: 15.4 %
SODIUM SERPL-SCNC: 141 MMOL/L
TIBC SERPL-MCNC: 270 UG/DL
TRIGL SERPL-MCNC: 50 MG/DL
TSH SERPL-ACNC: 0.64 UIU/ML
UIBC SERPL-MCNC: 165 UG/DL
WBC # FLD AUTO: 6.81 K/UL

## 2024-02-02 DIAGNOSIS — Z31.69 ENCOUNTER FOR OTHER GENERAL COUNSELING AND ADVICE ON PROCREATION: ICD-10-CM

## 2024-02-08 ENCOUNTER — APPOINTMENT (OUTPATIENT)
Dept: HUMAN REPRODUCTION | Facility: CLINIC | Age: 29
End: 2024-02-08
Payer: COMMERCIAL

## 2024-02-08 PROCEDURE — 99205 OFFICE O/P NEW HI 60 MIN: CPT

## 2024-02-15 ENCOUNTER — APPOINTMENT (OUTPATIENT)
Dept: HUMAN REPRODUCTION | Facility: CLINIC | Age: 29
End: 2024-02-15
Payer: COMMERCIAL

## 2024-02-15 PROCEDURE — 36415 COLL VENOUS BLD VENIPUNCTURE: CPT

## 2024-02-15 PROCEDURE — 99213 OFFICE O/P EST LOW 20 MIN: CPT | Mod: 25

## 2024-02-15 PROCEDURE — 76857 US EXAM PELVIC LIMITED: CPT

## 2024-02-20 ENCOUNTER — APPOINTMENT (OUTPATIENT)
Dept: HUMAN REPRODUCTION | Facility: CLINIC | Age: 29
End: 2024-02-20
Payer: COMMERCIAL

## 2024-02-20 PROCEDURE — 76831 ECHO EXAM UTERUS: CPT

## 2024-02-20 PROCEDURE — 58340 CATHETER FOR HYSTEROGRAPHY: CPT

## 2024-02-20 PROCEDURE — 74740 X-RAY FEMALE GENITAL TRACT: CPT

## 2024-02-20 PROCEDURE — 58999I: CUSTOM

## 2024-03-07 ENCOUNTER — APPOINTMENT (OUTPATIENT)
Dept: HUMAN REPRODUCTION | Facility: CLINIC | Age: 29
End: 2024-03-07
Payer: COMMERCIAL

## 2024-03-07 PROCEDURE — 99215 OFFICE O/P EST HI 40 MIN: CPT

## 2024-03-14 ENCOUNTER — RESULT CHARGE (OUTPATIENT)
Age: 29
End: 2024-03-14

## 2024-03-14 ENCOUNTER — APPOINTMENT (OUTPATIENT)
Dept: FAMILY MEDICINE | Facility: CLINIC | Age: 29
End: 2024-03-14
Payer: COMMERCIAL

## 2024-03-14 DIAGNOSIS — Z23 ENCOUNTER FOR IMMUNIZATION: ICD-10-CM

## 2024-03-14 DIAGNOSIS — Z32.00 ENCOUNTER FOR PREGNANCY TEST, RESULT UNKNOWN: ICD-10-CM

## 2024-03-14 PROCEDURE — 90471 IMMUNIZATION ADMIN: CPT

## 2024-03-14 PROCEDURE — 90716 VAR VACCINE LIVE SUBQ: CPT

## 2024-03-25 ENCOUNTER — APPOINTMENT (OUTPATIENT)
Dept: HUMAN REPRODUCTION | Facility: CLINIC | Age: 29
End: 2024-03-25

## 2024-03-29 ENCOUNTER — APPOINTMENT (OUTPATIENT)
Dept: HUMAN REPRODUCTION | Facility: CLINIC | Age: 29
End: 2024-03-29

## 2024-04-05 ENCOUNTER — APPOINTMENT (OUTPATIENT)
Dept: HUMAN REPRODUCTION | Facility: CLINIC | Age: 29
End: 2024-04-05
Payer: COMMERCIAL

## 2024-04-05 ENCOUNTER — NON-APPOINTMENT (OUTPATIENT)
Age: 29
End: 2024-04-05

## 2024-04-05 PROCEDURE — 99213 OFFICE O/P EST LOW 20 MIN: CPT | Mod: 25

## 2024-04-05 PROCEDURE — 36415 COLL VENOUS BLD VENIPUNCTURE: CPT

## 2024-04-05 PROCEDURE — 76857 US EXAM PELVIC LIMITED: CPT

## 2024-04-13 ENCOUNTER — APPOINTMENT (OUTPATIENT)
Dept: HUMAN REPRODUCTION | Facility: CLINIC | Age: 29
End: 2024-04-13
Payer: COMMERCIAL

## 2024-04-13 PROCEDURE — S4042: CPT

## 2024-04-13 PROCEDURE — 36415 COLL VENOUS BLD VENIPUNCTURE: CPT

## 2024-04-13 PROCEDURE — 99213 OFFICE O/P EST LOW 20 MIN: CPT | Mod: 25

## 2024-04-13 PROCEDURE — 76830 TRANSVAGINAL US NON-OB: CPT

## 2024-04-15 ENCOUNTER — APPOINTMENT (OUTPATIENT)
Dept: HUMAN REPRODUCTION | Facility: CLINIC | Age: 29
End: 2024-04-15
Payer: COMMERCIAL

## 2024-04-15 PROCEDURE — 36415 COLL VENOUS BLD VENIPUNCTURE: CPT

## 2024-04-15 PROCEDURE — 76857 US EXAM PELVIC LIMITED: CPT

## 2024-04-15 PROCEDURE — 99213 OFFICE O/P EST LOW 20 MIN: CPT | Mod: 25

## 2024-04-16 ENCOUNTER — TRANSCRIPTION ENCOUNTER (OUTPATIENT)
Age: 29
End: 2024-04-16

## 2024-04-17 ENCOUNTER — APPOINTMENT (OUTPATIENT)
Dept: HUMAN REPRODUCTION | Facility: CLINIC | Age: 29
End: 2024-04-17
Payer: COMMERCIAL

## 2024-04-17 ENCOUNTER — TRANSCRIPTION ENCOUNTER (OUTPATIENT)
Age: 29
End: 2024-04-17

## 2024-04-17 ENCOUNTER — NON-APPOINTMENT (OUTPATIENT)
Age: 29
End: 2024-04-17

## 2024-04-17 PROCEDURE — 76857 US EXAM PELVIC LIMITED: CPT

## 2024-04-17 PROCEDURE — 99213 OFFICE O/P EST LOW 20 MIN: CPT | Mod: 25

## 2024-04-17 PROCEDURE — 36415 COLL VENOUS BLD VENIPUNCTURE: CPT

## 2024-04-19 ENCOUNTER — APPOINTMENT (OUTPATIENT)
Dept: HUMAN REPRODUCTION | Facility: CLINIC | Age: 29
End: 2024-04-19
Payer: COMMERCIAL

## 2024-04-19 PROCEDURE — 36415 COLL VENOUS BLD VENIPUNCTURE: CPT

## 2024-04-19 PROCEDURE — 99213 OFFICE O/P EST LOW 20 MIN: CPT | Mod: 25

## 2024-04-19 PROCEDURE — 76857 US EXAM PELVIC LIMITED: CPT

## 2024-04-21 ENCOUNTER — APPOINTMENT (OUTPATIENT)
Dept: HUMAN REPRODUCTION | Facility: CLINIC | Age: 29
End: 2024-04-21
Payer: COMMERCIAL

## 2024-04-21 PROCEDURE — 99213 OFFICE O/P EST LOW 20 MIN: CPT | Mod: 25

## 2024-04-21 PROCEDURE — 36415 COLL VENOUS BLD VENIPUNCTURE: CPT

## 2024-04-21 PROCEDURE — 76857 US EXAM PELVIC LIMITED: CPT

## 2024-04-22 ENCOUNTER — APPOINTMENT (OUTPATIENT)
Dept: HUMAN REPRODUCTION | Facility: CLINIC | Age: 29
End: 2024-04-22
Payer: COMMERCIAL

## 2024-04-22 PROCEDURE — 76857 US EXAM PELVIC LIMITED: CPT

## 2024-04-22 PROCEDURE — 99213 OFFICE O/P EST LOW 20 MIN: CPT | Mod: 25

## 2024-04-22 PROCEDURE — 36415 COLL VENOUS BLD VENIPUNCTURE: CPT

## 2024-04-23 ENCOUNTER — APPOINTMENT (OUTPATIENT)
Dept: HUMAN REPRODUCTION | Facility: CLINIC | Age: 29
End: 2024-04-23
Payer: COMMERCIAL

## 2024-04-23 PROCEDURE — 36415 COLL VENOUS BLD VENIPUNCTURE: CPT

## 2024-04-24 ENCOUNTER — APPOINTMENT (OUTPATIENT)
Dept: HUMAN REPRODUCTION | Facility: CLINIC | Age: 29
End: 2024-04-24
Payer: COMMERCIAL

## 2024-04-24 PROCEDURE — 58970 RETRIEVAL OF OOCYTE: CPT

## 2024-04-24 PROCEDURE — 89250 CULTR OOCYTE/EMBRYO <4 DAYS: CPT

## 2024-04-24 PROCEDURE — 89261 SPERM ISOLATION COMPLEX: CPT

## 2024-04-24 PROCEDURE — 76948 ECHO GUIDE OVA ASPIRATION: CPT

## 2024-04-24 PROCEDURE — 89281 ASSIST OOCYTE FERTILIZATION: CPT

## 2024-04-24 PROCEDURE — 89254 OOCYTE IDENTIFICATION: CPT

## 2024-04-25 ENCOUNTER — APPOINTMENT (OUTPATIENT)
Dept: HUMAN REPRODUCTION | Facility: CLINIC | Age: 29
End: 2024-04-25
Payer: COMMERCIAL

## 2024-04-27 PROCEDURE — 89253 EMBRYO HATCHING: CPT

## 2024-04-29 PROCEDURE — 89258 CRYOPRESERVATION EMBRYO(S): CPT

## 2024-04-29 PROCEDURE — 89290 BIOPSY OOCYTE POLAR BODY <=5: CPT

## 2024-04-29 PROCEDURE — 89342 STORAGE/YEAR EMBRYO(S): CPT

## 2024-04-29 PROCEDURE — 89272 EXTENDED CULTURE OF OOCYTES: CPT

## 2024-04-30 PROCEDURE — 89258 CRYOPRESERVATION EMBRYO(S): CPT

## 2024-04-30 PROCEDURE — 89291 BIOPSY OOCYTE POLAR BODY: CPT

## 2024-05-21 ENCOUNTER — NON-APPOINTMENT (OUTPATIENT)
Age: 29
End: 2024-05-21

## 2024-06-08 ENCOUNTER — NON-APPOINTMENT (OUTPATIENT)
Age: 29
End: 2024-06-08

## 2024-06-20 ENCOUNTER — APPOINTMENT (OUTPATIENT)
Dept: HUMAN REPRODUCTION | Facility: CLINIC | Age: 29
End: 2024-06-20

## 2024-06-20 PROCEDURE — 99459 PELVIC EXAMINATION: CPT

## 2024-06-20 PROCEDURE — 99213 OFFICE O/P EST LOW 20 MIN: CPT

## 2024-06-20 PROCEDURE — 36415 COLL VENOUS BLD VENIPUNCTURE: CPT

## 2024-06-20 PROCEDURE — 76857 US EXAM PELVIC LIMITED: CPT

## 2024-06-24 ENCOUNTER — APPOINTMENT (OUTPATIENT)
Dept: HUMAN REPRODUCTION | Facility: CLINIC | Age: 29
End: 2024-06-24
Payer: COMMERCIAL

## 2024-06-24 PROCEDURE — 99213 OFFICE O/P EST LOW 20 MIN: CPT | Mod: 25

## 2024-06-24 PROCEDURE — 76857 US EXAM PELVIC LIMITED: CPT

## 2024-06-24 PROCEDURE — 36415 COLL VENOUS BLD VENIPUNCTURE: CPT

## 2024-06-27 ENCOUNTER — APPOINTMENT (OUTPATIENT)
Dept: HUMAN REPRODUCTION | Facility: CLINIC | Age: 29
End: 2024-06-27
Payer: COMMERCIAL

## 2024-06-27 PROCEDURE — 99213 OFFICE O/P EST LOW 20 MIN: CPT | Mod: 25

## 2024-06-27 PROCEDURE — 76857 US EXAM PELVIC LIMITED: CPT

## 2024-06-27 PROCEDURE — 36415 COLL VENOUS BLD VENIPUNCTURE: CPT

## 2024-06-28 ENCOUNTER — APPOINTMENT (OUTPATIENT)
Dept: HUMAN REPRODUCTION | Facility: CLINIC | Age: 29
End: 2024-06-28
Payer: COMMERCIAL

## 2024-06-28 PROCEDURE — 76857 US EXAM PELVIC LIMITED: CPT

## 2024-06-28 PROCEDURE — 99213 OFFICE O/P EST LOW 20 MIN: CPT | Mod: 25

## 2024-06-28 PROCEDURE — 36415 COLL VENOUS BLD VENIPUNCTURE: CPT

## 2024-06-29 ENCOUNTER — APPOINTMENT (OUTPATIENT)
Dept: HUMAN REPRODUCTION | Facility: CLINIC | Age: 29
End: 2024-06-29
Payer: COMMERCIAL

## 2024-06-29 PROCEDURE — 99213 OFFICE O/P EST LOW 20 MIN: CPT | Mod: 25

## 2024-06-29 PROCEDURE — 76857 US EXAM PELVIC LIMITED: CPT

## 2024-06-29 PROCEDURE — 36415 COLL VENOUS BLD VENIPUNCTURE: CPT

## 2024-07-01 ENCOUNTER — APPOINTMENT (OUTPATIENT)
Dept: HUMAN REPRODUCTION | Facility: CLINIC | Age: 29
End: 2024-07-01
Payer: COMMERCIAL

## 2024-07-01 PROCEDURE — 99213 OFFICE O/P EST LOW 20 MIN: CPT | Mod: 25

## 2024-07-01 PROCEDURE — 76857 US EXAM PELVIC LIMITED: CPT

## 2024-07-01 PROCEDURE — 36415 COLL VENOUS BLD VENIPUNCTURE: CPT

## 2024-07-02 ENCOUNTER — APPOINTMENT (OUTPATIENT)
Dept: HUMAN REPRODUCTION | Facility: CLINIC | Age: 29
End: 2024-07-02
Payer: COMMERCIAL

## 2024-07-02 PROCEDURE — 36415 COLL VENOUS BLD VENIPUNCTURE: CPT

## 2024-07-02 PROCEDURE — 99213 OFFICE O/P EST LOW 20 MIN: CPT | Mod: 25

## 2024-07-02 PROCEDURE — 76857 US EXAM PELVIC LIMITED: CPT

## 2024-07-03 ENCOUNTER — APPOINTMENT (OUTPATIENT)
Dept: HUMAN REPRODUCTION | Facility: CLINIC | Age: 29
End: 2024-07-03
Payer: COMMERCIAL

## 2024-07-03 PROCEDURE — 36415 COLL VENOUS BLD VENIPUNCTURE: CPT

## 2024-07-04 ENCOUNTER — APPOINTMENT (OUTPATIENT)
Dept: HUMAN REPRODUCTION | Facility: CLINIC | Age: 29
End: 2024-07-04
Payer: COMMERCIAL

## 2024-07-04 PROCEDURE — 89250 CULTR OOCYTE/EMBRYO <4 DAYS: CPT

## 2024-07-04 PROCEDURE — 89281 ASSIST OOCYTE FERTILIZATION: CPT

## 2024-07-04 PROCEDURE — 76948 ECHO GUIDE OVA ASPIRATION: CPT

## 2024-07-04 PROCEDURE — 58970 RETRIEVAL OF OOCYTE: CPT

## 2024-07-04 PROCEDURE — 89261 SPERM ISOLATION COMPLEX: CPT

## 2024-07-04 PROCEDURE — 89254 OOCYTE IDENTIFICATION: CPT

## 2024-07-05 ENCOUNTER — APPOINTMENT (OUTPATIENT)
Dept: HUMAN REPRODUCTION | Facility: CLINIC | Age: 29
End: 2024-07-05
Payer: COMMERCIAL

## 2024-07-07 PROCEDURE — 89253 EMBRYO HATCHING: CPT

## 2024-07-09 PROCEDURE — 89290 BIOPSY OOCYTE POLAR BODY <=5: CPT

## 2024-07-09 PROCEDURE — 89258 CRYOPRESERVATION EMBRYO(S): CPT

## 2024-07-09 PROCEDURE — 89272 EXTENDED CULTURE OF OOCYTES: CPT

## 2024-07-09 PROCEDURE — 89342 STORAGE/YEAR EMBRYO(S): CPT | Mod: NC

## 2024-07-10 PROCEDURE — 89291 BIOPSY OOCYTE POLAR BODY: CPT

## 2024-07-16 ENCOUNTER — APPOINTMENT (OUTPATIENT)
Dept: HUMAN REPRODUCTION | Facility: CLINIC | Age: 29
End: 2024-07-16
Payer: COMMERCIAL

## 2024-07-16 PROCEDURE — S4042: CPT

## 2024-07-16 PROCEDURE — 76830 TRANSVAGINAL US NON-OB: CPT

## 2024-07-16 PROCEDURE — 99213 OFFICE O/P EST LOW 20 MIN: CPT | Mod: 25

## 2024-07-16 PROCEDURE — 36415 COLL VENOUS BLD VENIPUNCTURE: CPT

## 2024-07-24 ENCOUNTER — APPOINTMENT (OUTPATIENT)
Dept: HUMAN REPRODUCTION | Facility: CLINIC | Age: 29
End: 2024-07-24
Payer: COMMERCIAL

## 2024-07-24 PROCEDURE — 76857 US EXAM PELVIC LIMITED: CPT

## 2024-07-24 PROCEDURE — 36415 COLL VENOUS BLD VENIPUNCTURE: CPT

## 2024-07-24 PROCEDURE — 99213 OFFICE O/P EST LOW 20 MIN: CPT | Mod: 25

## 2024-07-31 ENCOUNTER — APPOINTMENT (OUTPATIENT)
Dept: HUMAN REPRODUCTION | Facility: CLINIC | Age: 29
End: 2024-07-31
Payer: COMMERCIAL

## 2024-07-31 PROCEDURE — 99213 OFFICE O/P EST LOW 20 MIN: CPT | Mod: 25

## 2024-07-31 PROCEDURE — 76857 US EXAM PELVIC LIMITED: CPT

## 2024-07-31 PROCEDURE — 36415 COLL VENOUS BLD VENIPUNCTURE: CPT

## 2024-08-01 ENCOUNTER — APPOINTMENT (OUTPATIENT)
Dept: HUMAN REPRODUCTION | Facility: CLINIC | Age: 29
End: 2024-08-01
Payer: COMMERCIAL

## 2024-08-01 PROCEDURE — 36415 COLL VENOUS BLD VENIPUNCTURE: CPT

## 2024-08-07 ENCOUNTER — APPOINTMENT (OUTPATIENT)
Dept: HUMAN REPRODUCTION | Facility: CLINIC | Age: 29
End: 2024-08-07

## 2024-08-07 PROCEDURE — 36415 COLL VENOUS BLD VENIPUNCTURE: CPT

## 2024-08-09 ENCOUNTER — APPOINTMENT (OUTPATIENT)
Dept: HUMAN REPRODUCTION | Facility: CLINIC | Age: 29
End: 2024-08-09

## 2024-08-09 PROCEDURE — 76998 US GUIDE INTRAOP: CPT

## 2024-08-09 PROCEDURE — 58974 EMBRYO TRANSFER INTRAUTERINE: CPT

## 2024-08-09 PROCEDURE — 89352 THAWING CRYOPRESRVED EMBRYO: CPT

## 2024-08-09 PROCEDURE — 89255 PREPARE EMBRYO FOR TRANSFER: CPT

## 2024-08-09 PROCEDURE — 89398A: CUSTOM

## 2024-08-10 ENCOUNTER — APPOINTMENT (OUTPATIENT)
Dept: HUMAN REPRODUCTION | Facility: CLINIC | Age: 29
End: 2024-08-10

## 2024-08-19 ENCOUNTER — APPOINTMENT (OUTPATIENT)
Dept: HUMAN REPRODUCTION | Facility: CLINIC | Age: 29
End: 2024-08-19
Payer: COMMERCIAL

## 2024-08-19 PROCEDURE — 36415 COLL VENOUS BLD VENIPUNCTURE: CPT

## 2024-08-21 ENCOUNTER — APPOINTMENT (OUTPATIENT)
Dept: HUMAN REPRODUCTION | Facility: CLINIC | Age: 29
End: 2024-08-21
Payer: COMMERCIAL

## 2024-08-21 PROCEDURE — 36415 COLL VENOUS BLD VENIPUNCTURE: CPT

## 2024-08-29 ENCOUNTER — APPOINTMENT (OUTPATIENT)
Dept: HUMAN REPRODUCTION | Facility: CLINIC | Age: 29
End: 2024-08-29
Payer: COMMERCIAL

## 2024-08-29 PROCEDURE — 99213 OFFICE O/P EST LOW 20 MIN: CPT | Mod: 25

## 2024-08-29 PROCEDURE — 36415 COLL VENOUS BLD VENIPUNCTURE: CPT

## 2024-08-29 PROCEDURE — 76817 TRANSVAGINAL US OBSTETRIC: CPT

## 2024-09-03 ENCOUNTER — APPOINTMENT (OUTPATIENT)
Dept: HUMAN REPRODUCTION | Facility: CLINIC | Age: 29
End: 2024-09-03
Payer: COMMERCIAL

## 2024-09-03 PROCEDURE — 76817 TRANSVAGINAL US OBSTETRIC: CPT

## 2024-09-03 PROCEDURE — 99459 PELVIC EXAMINATION: CPT

## 2024-09-03 PROCEDURE — 36415 COLL VENOUS BLD VENIPUNCTURE: CPT

## 2024-09-03 PROCEDURE — 99213 OFFICE O/P EST LOW 20 MIN: CPT | Mod: 25

## 2024-09-09 ENCOUNTER — APPOINTMENT (OUTPATIENT)
Dept: HUMAN REPRODUCTION | Facility: CLINIC | Age: 29
End: 2024-09-09
Payer: COMMERCIAL

## 2024-09-09 PROCEDURE — 99459 PELVIC EXAMINATION: CPT

## 2024-09-09 PROCEDURE — 76817 TRANSVAGINAL US OBSTETRIC: CPT

## 2024-09-09 PROCEDURE — 99213 OFFICE O/P EST LOW 20 MIN: CPT | Mod: 25

## 2024-09-23 ENCOUNTER — APPOINTMENT (OUTPATIENT)
Dept: OBGYN | Facility: CLINIC | Age: 29
End: 2024-09-23
Payer: COMMERCIAL

## 2024-09-23 ENCOUNTER — LABORATORY RESULT (OUTPATIENT)
Age: 29
End: 2024-09-23

## 2024-09-23 VITALS
BODY MASS INDEX: 19.04 KG/M2 | HEIGHT: 64 IN | DIASTOLIC BLOOD PRESSURE: 82 MMHG | WEIGHT: 111.5 LBS | SYSTOLIC BLOOD PRESSURE: 123 MMHG

## 2024-09-23 DIAGNOSIS — N92.6 IRREGULAR MENSTRUATION, UNSPECIFIED: ICD-10-CM

## 2024-09-23 LAB
BASOPHILS # BLD AUTO: 1.02 K/UL
BASOPHILS NFR BLD AUTO: 8.7 %
EOSINOPHIL # BLD AUTO: 0.21 K/UL
EOSINOPHIL NFR BLD AUTO: 1.8 %
HBV SURFACE AG SER QL: NONREACTIVE
HCT VFR BLD CALC: 35.5 %
HCV AB SER QL: NONREACTIVE
HCV S/CO RATIO: 0.14 S/CO
HGB BLD-MCNC: 11 G/DL
HIV1+2 AB SPEC QL IA.RAPID: NONREACTIVE
LYMPHOCYTES # BLD AUTO: 4.58 K/UL
LYMPHOCYTES NFR BLD AUTO: 39.1 %
MAN DIFF?: NORMAL
MCHC RBC-ENTMCNC: 19.2 PG
MCHC RBC-ENTMCNC: 31 GM/DL
MCV RBC AUTO: 62.1 FL
MONOCYTES # BLD AUTO: 0.3 K/UL
MONOCYTES NFR BLD AUTO: 2.6 %
NEUTROPHILS # BLD AUTO: 5.6 K/UL
NEUTROPHILS NFR BLD AUTO: 47.8 %
PLATELET # BLD AUTO: 383 K/UL
RBC # BLD: 5.72 M/UL
RBC # FLD: 15.3 %
TSH SERPL-ACNC: 0.11 UIU/ML
WBC # FLD AUTO: 11.72 K/UL

## 2024-09-23 PROCEDURE — 99214 OFFICE O/P EST MOD 30 MIN: CPT | Mod: 25

## 2024-09-23 PROCEDURE — 76817 TRANSVAGINAL US OBSTETRIC: CPT

## 2024-09-23 NOTE — HISTORY OF PRESENT ILLNESS
[FreeTextEntry1] : 30yo P0 here for amenorrhea and +upt, would be 9+1 by ivf. Feeling well, denies n/v. Here with  Jayson today,  reports hx of significant DVT/clots in heart after covid vax so pt and  not interested in vaccines. 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   # IVF pregnancy [] fetal echo [] asa # Hx beta thalassemia [] partner testing # Routine - pap 9/2021 nilm

## 2024-09-23 NOTE — PROCEDURE
[Transvaginal OB Sonogram] : Transvaginal OB Sonogram [Intrauterine Pregnancy] : intrauterine pregnancy [Yolk Sac] : yolk sac present [Fetal Heart] : fetal heart present [Date: ___] : Date: [unfilled] [Current GA by Sonogram: ___ (wks)] : Current GA by Sonogram: [unfilled]Uwks [___ day(s)] : [unfilled] days [Transvaginal OB Sonogram WNL] : Transvaginal OB Sonogram WNL [FreeTextEntry1] : CRL c.w 9+3, + FHTs, + FHT 180s

## 2024-09-23 NOTE — PLAN
[FreeTextEntry1] : Discussed rationale for vaccines in pregnancy, pt declines for now for flu and covid. Diet/exercise/practice discussed. USC and PNL with eunice today. Discussed asa 1-2 tabs at 12 wk. RTO 4 wk

## 2024-09-23 NOTE — PLAN
[FreeTextEntry1] : Discussed rationale for vaccines in pregnancy, pt declines for now for flu and covid. Diet/exercise/practice discussed. USC and PNL with eunice today. RTO 4 wk

## 2024-09-23 NOTE — HISTORY OF PRESENT ILLNESS
[FreeTextEntry1] : 30yo P0 here for amenorrhea and +upt, would be 9+1 by ivf. Feeling well, denies n/v. Here with  Jayson today,  reports hx of significant DVT/clots in heart after covid vax so pt and  not interested in vaccines. 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   # IVF pregnancy for partner and pt both carriers of Usher syndrome [] fetal echo [] asa # Hx beta thalassemia - partner testing neg # IBS on colchicine # Routine - pap 9/2021 nilm

## 2024-09-24 LAB
ABO + RH PNL BLD: NORMAL
BLD GP AB SCN SERPL QL: NORMAL
C TRACH RRNA SPEC QL NAA+PROBE: NOT DETECTED
MEV IGG FLD QL IA: >300 AU/ML
MEV IGG+IGM SER-IMP: POSITIVE
N GONORRHOEA RRNA SPEC QL NAA+PROBE: NOT DETECTED
RUBV IGG FLD-ACNC: 2.3 INDEX
RUBV IGG SER-IMP: POSITIVE
SOURCE AMPLIFICATION: NORMAL
T PALLIDUM AB SER QL IA: NEGATIVE
VZV AB TITR SER: POSITIVE
VZV IGG SER IF-ACNC: 1.42 S/CO

## 2024-09-25 LAB — BACTERIA UR CULT: NORMAL

## 2024-09-26 LAB
FMR1 GENE MUT ANL BLD/T: NORMAL
HGB A MFR BLD: 94.6 %
HGB A2 MFR BLD: 5.1 %
HGB F MFR BLD: 0.3 %
HGB FRACT BLD-IMP: NORMAL

## 2024-09-27 LAB — LEAD BLD-MCNC: <1 UG/DL

## 2024-09-28 LAB — CYTOLOGY CVX/VAG DOC THIN PREP: NORMAL

## 2024-09-29 LAB — CFTR MUT TESTED BLD/T: NEGATIVE

## 2024-09-30 ENCOUNTER — NON-APPOINTMENT (OUTPATIENT)
Age: 29
End: 2024-09-30

## 2024-09-30 LAB — AR GENE MUT ANL BLD/T: ABNORMAL

## 2024-10-01 ENCOUNTER — APPOINTMENT (OUTPATIENT)
Dept: OBGYN | Facility: CLINIC | Age: 29
End: 2024-10-01
Payer: COMMERCIAL

## 2024-10-01 VITALS — DIASTOLIC BLOOD PRESSURE: 74 MMHG | SYSTOLIC BLOOD PRESSURE: 115 MMHG

## 2024-10-01 DIAGNOSIS — O20.0 THREATENED ABORTION: ICD-10-CM

## 2024-10-01 PROCEDURE — 76817 TRANSVAGINAL US OBSTETRIC: CPT

## 2024-10-01 PROCEDURE — 99213 OFFICE O/P EST LOW 20 MIN: CPT | Mod: 25

## 2024-10-01 NOTE — PLAN
[FreeTextEntry1] : Bleeding likey from cervical polyp. Reassurance provided as fetal well being reassuring on sono. Ok to start asa at 12 weeks. Pelvic rest advised. Pt reports emailed 's carrier screen. RTO for new OB.

## 2024-10-01 NOTE — HISTORY OF PRESENT ILLNESS
[FreeTextEntry1] :   30yo P0 at 10+2 here for vaginal bleeding since 130pm, reports saw bloody stringy material from the vagina. Minimal cramping, no recent intercourse.   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

## 2024-10-01 NOTE — PROCEDURE
[Transvaginal OB Sonogram] : Transvaginal OB Sonogram [Intrauterine Pregnancy] : intrauterine pregnancy [Yolk Sac] : yolk sac present [Current GA by Sonogram: ___ (wks)] : Current GA by Sonogram: [unfilled]Uwks [___ day(s)] : [unfilled] days [Transvaginal OB Sonogram WNL] : Transvaginal OB Sonogram WNL [FreeTextEntry1] : SIUP CRL c/w 10+6. FHT 180s,

## 2024-10-17 ENCOUNTER — ASOB RESULT (OUTPATIENT)
Age: 29
End: 2024-10-17

## 2024-10-17 ENCOUNTER — APPOINTMENT (OUTPATIENT)
Dept: ANTEPARTUM | Facility: CLINIC | Age: 29
End: 2024-10-17

## 2024-10-17 PROCEDURE — 76801 OB US < 14 WKS SINGLE FETUS: CPT | Mod: 59

## 2024-10-17 PROCEDURE — 76813 OB US NUCHAL MEAS 1 GEST: CPT

## 2024-10-24 ENCOUNTER — APPOINTMENT (OUTPATIENT)
Dept: OBGYN | Facility: CLINIC | Age: 29
End: 2024-10-24
Payer: COMMERCIAL

## 2024-10-24 VITALS
DIASTOLIC BLOOD PRESSURE: 78 MMHG | BODY MASS INDEX: 20.09 KG/M2 | WEIGHT: 117.06 LBS | SYSTOLIC BLOOD PRESSURE: 117 MMHG

## 2024-10-24 DIAGNOSIS — Z34.91 ENCOUNTER FOR SUPERVISION OF NORMAL PREGNANCY, UNSPECIFIED, FIRST TRIMESTER: ICD-10-CM

## 2024-10-24 PROCEDURE — 99215 OFFICE O/P EST HI 40 MIN: CPT

## 2024-10-24 PROCEDURE — 0501F PRENATAL FLOW SHEET: CPT

## 2024-10-28 LAB
T4 FREE SERPL-MCNC: 1.1 NG/DL
TSH SERPL-ACNC: 0.29 UIU/ML

## 2024-11-21 ENCOUNTER — NON-APPOINTMENT (OUTPATIENT)
Age: 29
End: 2024-11-21

## 2024-11-21 ENCOUNTER — APPOINTMENT (OUTPATIENT)
Dept: OBGYN | Facility: CLINIC | Age: 29
End: 2024-11-21
Payer: COMMERCIAL

## 2024-11-21 VITALS — DIASTOLIC BLOOD PRESSURE: 75 MMHG | SYSTOLIC BLOOD PRESSURE: 121 MMHG | WEIGHT: 121 LBS | BODY MASS INDEX: 20.77 KG/M2

## 2024-11-21 DIAGNOSIS — Z34.91 ENCOUNTER FOR SUPERVISION OF NORMAL PREGNANCY, UNSPECIFIED, FIRST TRIMESTER: ICD-10-CM

## 2024-11-21 PROCEDURE — 0502F SUBSEQUENT PRENATAL CARE: CPT

## 2024-11-21 PROCEDURE — 99214 OFFICE O/P EST MOD 30 MIN: CPT

## 2024-11-22 LAB
AFP INTERP SERPL-IMP: NORMAL
AFP INTERP SERPL-IMP: NORMAL
AFP MOM CUT-OFF: 2.5
AFP MOM SERPL: 1.47
AFP PERCENTILE: 88.4
AFP SERPL-ACNC: 72.66 NG/ML
CARBAMAZEPINE?: NO
CURRENT SMOKER: NORMAL
DIABETES STATUS PATIENT: NORMAL
GA: NORMAL
GESTATIONAL AGE METHOD: NORMAL
HX OF NTD NARR: NORMAL
MULTIPLE PREGNANCY: NORMAL
NEURAL TUBE DEFECT RISK FETUS: NORMAL
NEURAL TUBE DEFECT RISK POP: NORMAL
RECOM F/U: NO
TEST PERFORMANCE INFO SPEC: NORMAL
VALPROIC ACID?: NORMAL

## 2024-12-03 ENCOUNTER — NON-APPOINTMENT (OUTPATIENT)
Age: 29
End: 2024-12-03

## 2024-12-03 DIAGNOSIS — Z20.1 CONTACT WITH AND (SUSPECTED) EXPOSURE TO TUBERCULOSIS: ICD-10-CM

## 2024-12-09 ENCOUNTER — TRANSCRIPTION ENCOUNTER (OUTPATIENT)
Age: 29
End: 2024-12-09

## 2024-12-09 LAB
M TB IFN-G BLD-IMP: NEGATIVE
QUANTIFERON TB PLUS MITOGEN MINUS NIL: >10 IU/ML
QUANTIFERON TB PLUS NIL: 0.04 IU/ML
QUANTIFERON TB PLUS TB1 MINUS NIL: 0 IU/ML
QUANTIFERON TB PLUS TB2 MINUS NIL: 0 IU/ML

## 2024-12-10 ENCOUNTER — TRANSCRIPTION ENCOUNTER (OUTPATIENT)
Age: 29
End: 2024-12-10

## 2024-12-12 ENCOUNTER — ASOB RESULT (OUTPATIENT)
Age: 29
End: 2024-12-12

## 2024-12-12 ENCOUNTER — APPOINTMENT (OUTPATIENT)
Dept: ANTEPARTUM | Facility: CLINIC | Age: 29
End: 2024-12-12
Payer: COMMERCIAL

## 2024-12-12 PROCEDURE — 76811 OB US DETAILED SNGL FETUS: CPT

## 2024-12-17 ENCOUNTER — APPOINTMENT (OUTPATIENT)
Dept: PEDIATRIC CARDIOLOGY | Facility: CLINIC | Age: 29
End: 2024-12-17
Payer: COMMERCIAL

## 2024-12-17 PROCEDURE — 76825 ECHO EXAM OF FETAL HEART: CPT

## 2024-12-17 PROCEDURE — 93325 DOPPLER ECHO COLOR FLOW MAPG: CPT | Mod: 59

## 2024-12-17 PROCEDURE — 99203 OFFICE O/P NEW LOW 30 MIN: CPT | Mod: 25

## 2024-12-17 PROCEDURE — 76820 UMBILICAL ARTERY ECHO: CPT

## 2024-12-17 PROCEDURE — 76827 ECHO EXAM OF FETAL HEART: CPT

## 2024-12-20 ENCOUNTER — NON-APPOINTMENT (OUTPATIENT)
Age: 29
End: 2024-12-20

## 2024-12-23 ENCOUNTER — APPOINTMENT (OUTPATIENT)
Dept: OBGYN | Facility: CLINIC | Age: 29
End: 2024-12-23
Payer: COMMERCIAL

## 2024-12-23 VITALS
WEIGHT: 128 LBS | DIASTOLIC BLOOD PRESSURE: 80 MMHG | BODY MASS INDEX: 21.85 KG/M2 | SYSTOLIC BLOOD PRESSURE: 120 MMHG | HEIGHT: 64 IN

## 2024-12-23 PROCEDURE — 0502F SUBSEQUENT PRENATAL CARE: CPT

## 2024-12-23 PROCEDURE — 99213 OFFICE O/P EST LOW 20 MIN: CPT

## 2024-12-23 RX ORDER — CHOLECALCIFEROL (VITAMIN D3) 10(400)/ML
DROPS ORAL
Qty: 1 | Refills: 5 | Status: ACTIVE | COMMUNITY
Start: 2024-12-23 | End: 1900-01-01

## 2024-12-23 RX ORDER — BLOOD SUGAR DIAGNOSTIC
STRIP MISCELLANEOUS 4 TIMES DAILY
Qty: 30 | Refills: 2 | Status: ACTIVE | COMMUNITY
Start: 2024-12-23 | End: 1900-01-01

## 2024-12-23 RX ORDER — BLOOD-GLUCOSE METER
W/DEVICE KIT MISCELLANEOUS
Qty: 1 | Refills: 0 | Status: ACTIVE | COMMUNITY
Start: 2024-12-23 | End: 1900-01-01

## 2024-12-24 ENCOUNTER — NON-APPOINTMENT (OUTPATIENT)
Age: 29
End: 2024-12-24

## 2024-12-26 ENCOUNTER — APPOINTMENT (OUTPATIENT)
Dept: FAMILY MEDICINE | Facility: CLINIC | Age: 29
End: 2024-12-26
Payer: COMMERCIAL

## 2024-12-26 VITALS
WEIGHT: 130 LBS | OXYGEN SATURATION: 100 % | TEMPERATURE: 98.4 F | RESPIRATION RATE: 14 BRPM | BODY MASS INDEX: 22.31 KG/M2 | SYSTOLIC BLOOD PRESSURE: 113 MMHG | DIASTOLIC BLOOD PRESSURE: 70 MMHG | HEART RATE: 88 BPM

## 2024-12-26 DIAGNOSIS — D56.3 THALASSEMIA MINOR: ICD-10-CM

## 2024-12-26 DIAGNOSIS — Z31.69 ENCOUNTER FOR OTHER GENERAL COUNSELING AND ADVICE ON PROCREATION: ICD-10-CM

## 2024-12-26 DIAGNOSIS — Z92.29 PERSONAL HISTORY OF OTHER DRUG THERAPY: ICD-10-CM

## 2024-12-26 DIAGNOSIS — Z34.93 ENCOUNTER FOR SUPERVISION OF NORMAL PREGNANCY, UNSPECIFIED, THIRD TRIMESTER: ICD-10-CM

## 2024-12-26 DIAGNOSIS — Z23 ENCOUNTER FOR IMMUNIZATION: ICD-10-CM

## 2024-12-26 DIAGNOSIS — Z00.00 ENCOUNTER FOR GENERAL ADULT MEDICAL EXAMINATION W/OUT ABNORMAL FINDINGS: ICD-10-CM

## 2024-12-26 PROCEDURE — 99395 PREV VISIT EST AGE 18-39: CPT

## 2024-12-26 RX ORDER — KRILL/OM-3/DHA/EPA/PHOSPHO/AST 1000-230MG
81 CAPSULE ORAL DAILY
Qty: 90 | Refills: 2 | Status: ACTIVE | COMMUNITY
Start: 2024-12-26

## 2025-01-27 ENCOUNTER — APPOINTMENT (OUTPATIENT)
Dept: OBGYN | Facility: CLINIC | Age: 30
End: 2025-01-27

## 2025-02-04 ENCOUNTER — APPOINTMENT (OUTPATIENT)
Dept: ANTEPARTUM | Facility: CLINIC | Age: 30
End: 2025-02-04

## 2025-06-23 NOTE — DISCHARGE NOTE ADULT - FUNCTIONAL SCREEN CURRENT LEVEL: AMBULATION, MLM
(0) independent
I personally performed the service described in the documentation recorded by the scribe in my presence, and it accurately and completely records my words and actions.